# Patient Record
Sex: FEMALE | Race: BLACK OR AFRICAN AMERICAN | Employment: PART TIME | URBAN - METROPOLITAN AREA
[De-identification: names, ages, dates, MRNs, and addresses within clinical notes are randomized per-mention and may not be internally consistent; named-entity substitution may affect disease eponyms.]

---

## 2021-03-18 ENCOUNTER — IMMUNIZATION (OUTPATIENT)
Dept: PRIMARY CARE CLINIC | Age: 56
End: 2021-03-18
Payer: MEDICARE

## 2021-03-18 PROCEDURE — 0011A PR IMM ADMN SARSCOV2 100 MCG/0.5 ML 1ST DOSE: CPT | Performed by: FAMILY MEDICINE

## 2021-03-18 PROCEDURE — 91301 COVID-19, MODERNA VACCINE 100MCG/0.5ML DOSE: CPT | Performed by: FAMILY MEDICINE

## 2021-04-15 ENCOUNTER — IMMUNIZATION (OUTPATIENT)
Dept: PRIMARY CARE CLINIC | Age: 56
End: 2021-04-15
Payer: MEDICARE

## 2021-04-15 PROCEDURE — 91301 COVID-19, MODERNA VACCINE 100MCG/0.5ML DOSE: CPT | Performed by: FAMILY MEDICINE

## 2021-04-15 PROCEDURE — 0012A COVID-19, MODERNA VACCINE 100MCG/0.5ML DOSE: CPT | Performed by: FAMILY MEDICINE

## 2021-06-09 ENCOUNTER — HOSPITAL ENCOUNTER (OUTPATIENT)
Dept: PHYSICAL THERAPY | Age: 56
Setting detail: THERAPIES SERIES
Discharge: HOME | End: 2021-06-09
Payer: MEDICARE

## 2021-06-09 PROCEDURE — G0283 ELEC STIM OTHER THAN WOUND: HCPCS

## 2021-06-09 PROCEDURE — 97140 MANUAL THERAPY 1/> REGIONS: CPT

## 2021-06-09 PROCEDURE — 97110 THERAPEUTIC EXERCISES: CPT

## 2021-06-09 PROCEDURE — 97162 PT EVAL MOD COMPLEX 30 MIN: CPT

## 2021-06-09 ASSESSMENT — PAIN SCALES - QUEBEC BACK PAIN DISABILITY SCALE
LIFT AND CARRY A HEAVY SUITCASE: 5
THROW A BALL: 1
PULL OR PUSH HEAVY DOORS: 5
PUT ON SOCKS OR PANYHOSE: 0
REACH UP TO HIGH SHELVES: 5
RIDE IN A CAR: 2
WALK A FEW BLOCKS OR 300 TO 400M: 4
MAKE YOUR BED: 3
CARRY TWO BAGS OF GROCERIES: 1
RUN ONE BLOCK OR 100M: 5
TAKE FOOD OUT OF THE REFRIGERATOR: 0
SIT IN A CHAIR FOR SEVERAL HOURS: 4
QUEBEC DISABILITY INDEX: 60-79%
BEND OVER TO CLEAN THE BATHTUB: 4
MOVE A CHAIR: 1
TOTAL SCORE: 61
TURN OVER IN BED: 4
QUEBEC CMS MODIFIER: CL
STAND UP FOR 20 TO 30 MINUTES: 4
GET OUT OF BED: 2
SLEEP THROUGH THE NIGHT: 4
WALK SEVERAL KILOMETERS  OR MILES: 5
CLIMB ONE FLIGHT OF STAIRS: 2

## 2021-06-09 NOTE — FLOWSHEET NOTE
[facility] [place]. [address]  Phone: [phone]   Fax:     [phone]    Physical Therapy Treatment Note/ Progress Report:     Date:  2021    Patient Name:  Aj Valadez    :  1965  MRN: [id number]    Pertinent Medical History:      Medical/Treatment Diagnosis Information:  · Diagnosis: M54.5 (ICD-10-CM) - Low back pain  · Treatment Diagnosis: Back pain on left lumbar areas and hip. diffficutly with ADL's. limited mobility, weakness, difficulty with walking. Insurance/Certification information:  PT Insurance Information: [employer]  Physician Information:  Referring Practitioner: Theresa Mcpherson MD  Plan of care signed (Y/N): Sent to Dr. Luisa Vasquez on 21  Date of Patient follow up with Physician:      Progress Report: []  Yes  [x]  No     Date Range for reporting period:  Beginnin2021  Ending:    Progress report due (10 Rx/or 30 days whichever is less):     Recertification due (POC duration/ or 90 days whichever is less):    Visit # POC/ Insurance Allowable Auth Needed   1  []Yes    []No     Functional Scale:       Date Assessed: at eval  Test: [place]  Score: 61    Pain level:  5/10     History of Injury:Pt reports that pain began on May 9, 2021 when she woke up that morning. She has been walking dogs since last July and feels that  her pain may be related to the dogs pulling on the leash. She tends to hold the dog leash on the right side. Her pain is on the left low back. She also has OA on lumbar spine. Her pain is getting better.     SUBJECTIVE:  See eval    OBJECTIVE: See eval   Observation:    Test measurements:      RESTRICTIONS/PRECAUTIONS: OA and RA    Exercises/Interventions:     Therapeutic Ex (74464)   Min: Resistance/Reps Notes/Cues   Seated lateral flexion to right  add   LTR  add                                      Manual Intervention ([id number]) Disability index score of 10 % or less for the TUTU to assist with reaching prior level of function. []? Progressing: []? Met: []? Not Met: []? Adjusted  2. Patient will demonstrate increased AROM to WNL, good LS mobility, good hip ROM to allow for proper joint functioning as indicated by patients Functional Deficits. []? Progressing: []? Met: []? Not Met: []? Adjusted  3. Patient will demonstrate an increase in Strength to good proximal hip and core activation to allow for proper functional mobility as indicated by patients Functional Deficits. []? Progressing: []? Met: []? Not Met: []? Adjusted  4. Patient will return to 90% functional activities without increased symptoms or restriction. []? Progressing: []? Met: []? Not Met: []? Adjusted  5. Pt will be able to resume household chores without increase of symptoms (patient specific functional goal)    []? Progressing: []? Met: []? Not Met: []? Adjusted          ASSESSMENT:  Pt reports pain that started when she was walking dogs and they pulled her to the right, straining the left lumbar spine. Her referring MD indicated that he believes her pain is more muscular and her imaging shows some DDD and nerve compression as well. Treatment/Activity Tolerance:  [x] Patient tolerated treatment well [] Patient limited by fatique  [] Patient limited by pain  [] Patient limited by other medical complications  [] Other:     Overall Progression Towards Functional goals/ Treatment Progress Update:  [] Patient is progressing as expected towards functional goals listed. [] Progression is slowed due to complexities/Impairments listed. [] Progression has been slowed due to co-morbidities.   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 Continue with manual therapy and modalities as needed. Add exercise. [] Continue per plan of care [] Alter current plan (see comments)  [x] Plan of care initiated [] Hold pending MD visit [] Discharge    Electronically signed by: Jarrett Oliva, PT    Note: If patient does not return for scheduled/recommended follow up visits, this note will serve as a discharge from care along with the most recent update on progress.

## 2021-06-09 NOTE — PLAN OF CARE
[address] [facility] [place]. [address] [address]  Phone: [phone]   Fax:     [phone]                                                       Physical Therapy Certification    Dear Referring Practitioner: Rajiv Bowden MD,    We had the pleasure of evaluating the following patient for physical therapy services at [facility]. A summary of our findings can be found in the initial assessment below. This includes our plan of care. If you have any questions or concerns regarding these findings, please do not hesitate to contact me at the office phone number checked above. Thank you for the referral.       Physician Signature:_______________________________Date:__________________  By signing above (or electronic signature), therapists plan is approved by physician                  Patient: Gaurav Hyman   : 1965   MRN: [id number]  Referring Physician: Referring Practitioner: Rajiv Bowden MD      Evaluation Date: 2021      Medical Diagnosis Information:  Diagnosis: M54.5 (ICD-10-CM) - Low back pain   Treatment Diagnosis: Back pain on left lumbar areas and hip. diffficutly with ADL's. limited mobility, weakness, difficulty with walking.                                          Insurance information: PT Insurance Information: [employer]     Precautions/ Contra-indications:   Latex Allergy:  [x]NO      []YES  Preferred Language for Healthcare:   [x]English       []other:    C-SSRS Triggered by Intake questionnaire (Past 2 wk assessment ):   [x] No, Questionnaire did not trigger screening.   [] Yes, Patient intake triggered C-SSRS Screening      [] C-SSRS Screening completed  [] PCP notified via Epic     SUBJECTIVE: Patient stated complaint:  Back pain    Relevant Medical History:   Functional Outcome Measure: [place] =  61    Pain Scale:5/10  Easing factors: Rest  Provocative factors: walking,standing, bending    Type: [x]Constant   []Intermittent  []Radiating []Localized []other:     Numbness/Tingling: Lateral and medial aspect of both legs, equal on chava sides    Occupation/School:  On disability    Living Status/Prior Level of Function: Independent with ADLs and IADLs,     OBJECTIVE:   Posture: pes planus bilaterally    Functional Mobility/Transfers: Slow and painful    Palpation: Tightness noted on left paraspinals    Gait: (include devices/WB status) slow federico, left hip internal rotation, normal and equal stride length, minimal trunk rotation     Bandages/Dressings/Incisions: NA    Repeated Movements:    ROM  Comments   Lumbar Flex 71    Lumbar Ext 18      ROM LEFT RIGHT Comments   Lumbar Side Bend 20 21    Lumbar Rotation 25% 35%    Quadrant   Not tested   Hip Flexion 85 90    Hip Abd      Hip ER      Hip IR      Hip Extension      Knee Ext 0 0    Knee Flex 110 130    Hamstring Flex      Piriformis      Cleve test                Myotomes/Strength Normal Abnormal Comments   []ALL NORMAL      Hip Abd      Hip Ext      Hip flexion (L1-L2 femoral) [] [x] Weakness noted bilaterally   Knee extension (L2-L4 femoral) [] [x] Weakness noted   Knee flexion (S1 sciatic)   Weakness noted    Dorsiflexion (L4-L5 deep peroneal) [] [x] Weakness noted   Great Toe Ext (L5 deep peroneal nerve) [] []    Ankle Eversion (S1-S2 super peroneal) [] []    Ankle PF(S1-S2 tibial) [] []    Multifidus [] []    Transverse Ab [] []      Dermatomes Normal Abnormal Comments   []ALL NORMAL            inguinal area (L1)  [] []    anterior mid-thigh (L2) [x] []    distal ant thigh/med knee (L3) [x] []    medial lower leg and foot (L4) [] [x]    lateral lower leg and foot (L5) [] [x]    posterior calf (S1) [] []    medial calcaneus (S2) [] []      Reflexes Normal Abnormal Comments   [x]ALL NORMAL            S1-2 Seated achilles [x] []    S1-2 Prone knee bend [] []    L3-4 Patellar tendon [x] [] C5-6 Biceps [] []    C6 Brachioradialis [] []    C7-8 Triceps [] []    Clonus [] []    Babinski [] []    Villareal's [] []      Joint mobility:    []Normal    [x]Hypo on lumbar spine   []Hyper    Neurodynamics:     Orthopedic Special Tests:   Neural dynamic tension testing Normal Abnormal Comments   Slump Test  - Degree of knee flexion:  [] []    SLR  [] []    0-30 [] []    30-70 [] []    Femoral nerve (L2-4) [] []       Normal Abnormal N/A Comments   Fwd Bend-aberrant or innominate mvmt) [] [] []    Trendelenburg [] [] []    Kemps/Ale [] [] []    Stork [] [] []    JUSTIN/Juve [] [] []    Hip scour [] [] []    Supine to sit [] [] []    Prone knee bend [] [] [] Pt is unable to lie prone due to iliostomy          Hip thrust [] [] []    SI distraction/compression [] [] []    Sacral Spring/thrust [] [] []               [x] Patient history, allergies, meds reviewed. Medical chart reviewed. See intake form. Review Of Systems (ROS):  [x]Performed Review of systems (Integumentary, CardioPulmonary, Neurological) by intake and observation. Intake form has been scanned into medical record. Patient has been instructed to contact their primary care physician regarding ROS issues if not already being addressed at this time.       Co-morbidities/Complexities (which will affect course of rehabilitation):   []None           Arthritic conditions   [x]Rheumatoid arthritis (M05.9)  [x]Osteoarthritis (M19.91)   Cardiovascular conditions   []Hypertension (I10)  []Hyperlipidemia (E78.5)  []Angina pectoris (I20)  []Atherosclerosis (I70)  []CVA Musculoskeletal conditions   []Disc pathology   []Congenital spine pathologies   []Prior surgical intervention  []Osteoporosis (M81.8)  []Osteopenia (M85.8)   Endocrine conditions   []Hypothyroid (E03.9)  []Hyperthyroid Gastrointestinal conditions   []Constipation (Y32.28)   Metabolic conditions   []Morbid obesity (E66.01)  []Diabetes type 1(E10.65) or 2 (E11.65)   []Neuropathy (G60.9) ability to ascend/descend stairs   []other:       Participation Restrictions   []Reduced participation in self care activities   [x]Reduced participation in home management activities   []Reduced participation in work activities   [x]Reduced participation in social activities. []Reduced participation in sport/recreational activities. Classification:   []Signs/symptoms consistent with Lumbar instability/stabilization subgroup. [x]Signs/symptoms consistent with Lumbar mobilization/manipulation subgroup, myotomes and dermatomes intact. Meets manipulation criteria. []Signs/symptoms consistent with Lumbar direction specific/centralization subgroup   [x]Signs/symptoms consistent with Lumbar traction subgroup       []Signs/symptoms consistent with lumbar facet dysfunction   []Signs/symptoms consistent with lumbar stenosis type dysfunction   []Signs/symptoms consistent with nerve root involvement including myotome & dermatome dysfunction   []Signs/symptoms consistent with post-surgical status including: decreased ROM, strength and function.    []signs/symptoms consistent with pathology which may benefit from Dry needling     []other:      Prognosis/Rehab Potential:      []Excellent   [x]Good    []Fair   []Poor    Tolerance of evaluation/treatment:    []Excellent   [x]Good    []Fair   []Poor     Physical Therapy Evaluation Complexity Justification  [x] A history of present problem with:  [] no personal factors and/or comorbidities that impact the plan of care;  []1-2 personal factors and/or comorbidities that impact the plan of care  [x]3 personal factors and/or comorbidities that impact the plan of care  [x] An examination of body systems using standardized tests and measures addressing any of the following: body structures and functions (impairments), activity limitations, and/or participation restrictions;:  [] a total of 1-2 or more elements   [x] a total of 3 or more elements   [] a total of 4 or more elements   [x] A clinical presentation with:  [] stable and/or uncomplicated characteristics   [x] evolving clinical presentation with changing characteristics  [] unstable and unpredictable characteristics;   [x] Clinical decision making of [] low, [x] moderate, [] high complexity using standardized patient assessment instrument and/or measurable assessment of functional outcome. [] EVAL (LOW) 30658 (typically 20 minutes face-to-face)  [x] EVAL (MOD) 76663 (typically 30 minutes face-to-face)  [] EVAL (HIGH) 76400 (typically 45 minutes face-to-face)  [] RE-EVAL     PLAN: Begin PT focusing on: proximal hip mobilizations, LB mobs, LB core activation, proximal hip activation, and HEP    Frequency/Duration:  2 days per week for 4-6 Weeks:  Interventions:  [x]  Therapeutic exercise including: strength training, ROM, for LE, Glutes and core   [x]  NMR activation and proprioception for glutes , LE and Core   [x]  Manual therapy as indicated for Hip complex, LE and spine to include: Dry Needling/IASTM, STM, PROM, Gr I-IV mobilizations, manipulation. [x]  Modalities as needed that may include: thermal agents, E-stim, Biofeedback, US, iontophoresis as indicated  [x]  Patient education on joint protection, postural re-education, activity modification, progression of HEP. HEP instruction:  Will teach next time    GOALS:  Patient stated goal: \"To get to at least 1%  or less  pain\"  [] Progressing: [] Met: [] Not Met: [] Adjusted  Therapist goals for Patient:   Short Term Goals: To be achieved in: 2 weeks  1. Independent in HEP and progression per patient tolerance, in order to prevent re-injury. [] Progressing: [] Met: [] Not Met: [] Adjusted  2. Patient will have a decrease in pain to facilitate improvement in movement, function, and ADLs as indicated by Functional Deficits. [] Progressing: [] Met: [] Not Met: [] Adjusted    Long Term Goals: To be achieved in: 4-6 weeks  1.  Disability index score of 10 % or less for the TUTU to assist with reaching prior level of function. [] Progressing: [] Met: [] Not Met: [] Adjusted  2. Patient will demonstrate increased AROM to WNL, good LS mobility, good hip ROM to allow for proper joint functioning as indicated by patients Functional Deficits. [] Progressing: [] Met: [] Not Met: [] Adjusted  3. Patient will demonstrate an increase in Strength to good proximal hip and core activation to allow for proper functional mobility as indicated by patients Functional Deficits. [] Progressing: [] Met: [] Not Met: [] Adjusted  4. Patient will return to 90% functional activities without increased symptoms or restriction. [] Progressing: [] Met: [] Not Met: [] Adjusted  5. Pt will be able to resume household chores without increase of symptoms (patient specific functional goal)    [] Progressing: [] Met: [] Not Met: [] Adjusted     Electronically signed by:  Soraya Monroy PT        Note: If patient does not return for scheduled/recommended follow up visits, this note will serve as a discharge from care along with the most recent update on progress.

## 2021-06-16 ENCOUNTER — HOSPITAL ENCOUNTER (OUTPATIENT)
Dept: PHYSICAL THERAPY | Age: 56
Setting detail: THERAPIES SERIES
Discharge: HOME | End: 2021-06-16
Payer: MEDICARE

## 2021-06-16 PROCEDURE — 97140 MANUAL THERAPY 1/> REGIONS: CPT

## 2021-06-16 PROCEDURE — 97530 THERAPEUTIC ACTIVITIES: CPT

## 2021-06-16 PROCEDURE — G0283 ELEC STIM OTHER THAN WOUND: HCPCS

## 2021-06-16 PROCEDURE — 97110 THERAPEUTIC EXERCISES: CPT

## 2021-06-16 NOTE — FLOWSHEET NOTE
right 30 sec x 3 Added 6/16/21   LTR 2 min Added 6/16/21   PPT 5 sec x 10 Added 6/16   Bridging 5 sec x 10 Added 6/16   Clamshells 20X Added 6/16                       Manual Intervention (32610) Min:      Right sidelying STM to left paraspinals, Intervertebral gapping through rotation, MET while left sidelying         NMR re-education (63132)   Min:     Mf Activation- re-ed     TrA Re-ed activation     Glute Max re-ed activation          Therapeutic Activity (18689) Min:      Added body mechanics and log rolling, gave brochure to reinforce principles with demonstration by PT         Modalities  Min:       IFC with heat while right sidelying x 15 min (3 electrodes on left, one on right      Other Therapeutic Activities:  Pt was educated on PT POC, Diagnosis, Prognosis, pathomechanics as well as frequency and duration of scheduling future physical therapy appointments. Time was also taken on this day to answer all patient questions and participation in PT. Reviewed appointment policy in detail with patient and patient verbalized understanding. Home Exercise Program:   Access Code: [email or web address]  URL: [employer]/  Date: 06/16/2021  Prepared by:  Richardson Lewis    Exercises  Supine Bridge - 1 x daily - 7 x weekly - 10 reps - 3 sets  Clamshell - 1 x daily - 7 x weekly - 10 reps - 3 sets  Supine Lower Trunk Rotation - 1 x daily - 7 x weekly - 10 reps - 3 sets  Supine Posterior Pelvic Tilt - 1 x daily - 7 x weekly - 10 reps - 3 sets  Seated Sidebending - 1 x daily - 7 x weekly - 10 reps - 3 sets    Therapeutic Exercise and NMR EXR  [x] (25822) Provided verbal/tactile cueing for activities related to strengthening, flexibility, endurance, ROM  for improvements in proximal hip and core control with self care, mobility, lifting and ambulation.  [] (86368) Provided verbal/tactile cueing for activities related to improving balance, coordination, kinesthetic sense, posture, motor skill, proprioception  to 1  [] [facility] Traction (81881)  [] ES(attended) (12185)     [x] ES (un) (70959): 15 min  [] Vasopump (64540) [] Ionto (22691)   [] Other:    GOALS:  Patient stated goal: \"To get to at least 1%  or less  pain\"  []? Progressing: []? Met: []? Not Met: []? Adjusted  Therapist goals for Patient:   Short Term Goals: To be achieved in: 2 weeks  1. Independent in HEP and progression per patient tolerance, in order to prevent re-injury. []? Progressing: []? Met: []? Not Met: []? Adjusted  2. Patient will have a decrease in pain to facilitate improvement in movement, function, and ADLs as indicated by Functional Deficits. []? Progressing: []? Met: []? Not Met: []? Adjusted     Long Term Goals: To be achieved in: 4-6 weeks  1. Disability index score of 10 % or less for the TUTU to assist with reaching prior level of function. []? Progressing: []? Met: []? Not Met: []? Adjusted  2. Patient will demonstrate increased AROM to WNL, good LS mobility, good hip ROM to allow for proper joint functioning as indicated by patients Functional Deficits. []? Progressing: []? Met: []? Not Met: []? Adjusted  3. Patient will demonstrate an increase in Strength to good proximal hip and core activation to allow for proper functional mobility as indicated by patients Functional Deficits. []? Progressing: []? Met: []? Not Met: []? Adjusted  4. Patient will return to 90% functional activities without increased symptoms or restriction. []? Progressing: []? Met: []? Not Met: []? Adjusted  5. Pt will be able to resume household chores without increase of symptoms (patient specific functional goal)    []? Progressing: []? Met: []? Not Met: []? Adjusted          ASSESSMENT:  Pt reports pain that started when she was walking dogs and they pulled her to the right, straining the left lumbar spine. Her referring MD indicated that he believes her pain is more muscular and her imaging shows some DDD and nerve compression as well.  Pt tolerated treatment well and seemed to understand principles of proper body mechanics. Treatment/Activity Tolerance:  [x] Patient tolerated treatment well [] Patient limited by fatique  [] Patient limited by pain  [] Patient limited by other medical complications  [] Other:     Overall Progression Towards Functional goals/ Treatment Progress Update:  [] Patient is progressing as expected towards functional goals listed. [] Progression is slowed due to complexities/Impairments listed. [] Progression has been slowed due to co-morbidities. 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 Continue with manual therapy and modalities as needed. Add exercise. [x] Continue per plan of care [] Alter current plan (see comments)  [] Plan of care initiated [] Hold pending MD visit [] Discharge    Electronically signed by: Alyssa Griffith, PT    Note: If patient does not return for scheduled/recommended follow up visits, this note will serve as a discharge from care along with the most recent update on progress.

## 2021-06-17 ENCOUNTER — HOSPITAL ENCOUNTER (OUTPATIENT)
Dept: PHYSICAL THERAPY | Age: 56
Setting detail: THERAPIES SERIES
Discharge: HOME | End: 2021-06-17
Payer: MEDICARE

## 2021-06-17 PROCEDURE — 97110 THERAPEUTIC EXERCISES: CPT

## 2021-06-17 PROCEDURE — 97140 MANUAL THERAPY 1/> REGIONS: CPT

## 2021-06-17 PROCEDURE — G0283 ELEC STIM OTHER THAN WOUND: HCPCS

## 2021-06-17 NOTE — FLOWSHEET NOTE
[facility]. [address]  Phone: [phone]   Fax:     [phone]    Physical Therapy Treatment Note/ Progress Report:     Date:  2021    Patient Name:  Annabelle Schafer    :  1965  MRN: [id number]    Pertinent Medical History:      Medical/Treatment Diagnosis Information:  · Diagnosis: M54.5 (ICD-10-CM) - Low back pain  · Treatment Diagnosis: Back pain on left lumbar areas and hip. diffficutly with ADL's. limited mobility, weakness, difficulty with walking. Insurance/Certification information:  PT Insurance Information: [employer]  Physician Information:  Referring Practitioner: Yoshi Carvajal MD  Plan of care signed (Y/N): Sent to Dr. Elenora Harada on 21  Date of Patient follow up with Physician:      Progress Report: []  Yes  [x]  No     Date Range for reporting period:  Beginnin2021  Ending:    Progress report due (10 Rx/or 30 days whichever is less):     Recertification due (POC duration/ or 90 days whichever is less):    Visit # POC/ Insurance Allowable Auth Needed   3  []Yes    []No     Functional Scale:       Date Assessed: at eval  Test: [place]  Score: 61    Pain level:  6.5/10     History of Injury:Pt reports that pain began on May 9, 2021 when she woke up that morning. She has been walking dogs since last July and feels that  her pain may be related to the dogs pulling on the leash. She tends to hold the dog leash on the right side. Her pain is on the left low back. She also has OA on lumbar spine. Her pain is getting better. SUBJECTIVE:  21: Pt reports that she still feels like 5/10, but did note relief after the last session. 21: Pt reports that she is having a rough day  since she had a procedure and abdominal biopsy yesterday.     OBJECTIVE: See eval   Observation:    Test measurements:      RESTRICTIONS/PRECAUTIONS: OA and RA    Exercises/Interventions:     Therapeutic Ex (71522)   Min: Resistance/Reps Notes/Cues    30 sec x 3 Added 6/16/21   LTR 2 min Added 6/16/21   PPT 5 sec x 10 Added 6/16   Bridging 5 sec x 10 Added 6/16   Clamshells 20X Added 6/16                       Manual Intervention (50352) Min:      Right sidelying STM to left paraspinals, Intervertebral gapping through rotation, MET while left sidelying         NMR re-education (03367)   Min:     Mf Activation- re-ed     TrA Re-ed activation     Glute Max re-ed activation          Therapeutic Activity (44500) Min:               Modalities  Min:       IFC with heat while right sidelying x 15 min (3 electrodes on left, one on right      Other Therapeutic Activities:  Pt was educated on PT POC, Diagnosis, Prognosis, pathomechanics as well as frequency and duration of scheduling future physical therapy appointments. Time was also taken on this day to answer all patient questions and participation in PT. Reviewed appointment policy in detail with patient and patient verbalized understanding. Home Exercise Program:   Access Code: [email or web address]  URL: [employer]/  Date: 06/16/2021  Prepared by:  Elizabeth Salvage    Exercises  Supine Bridge - 1 x daily - 7 x weekly - 10 reps - 3 sets  Clamshell - 1 x daily - 7 x weekly - 10 reps - 3 sets  Supine Lower Trunk Rotation - 1 x daily - 7 x weekly - 10 reps - 3 sets  Supine Posterior Pelvic Tilt - 1 x daily - 7 x weekly - 10 reps - 3 sets  Seated Sidebending - 1 x daily - 7 x weekly - 10 reps - 3 sets    Therapeutic Exercise and NMR EXR  [x] (10049) Provided verbal/tactile cueing for activities related to strengthening, flexibility, endurance, ROM  for improvements in proximal hip and core control with self care, mobility, lifting and ambulation.  [] (67004) Provided verbal/tactile cueing for activities related to improving balance, coordination, kinesthetic sense, posture, motor skill, proprioception  to assist with core control in self care, mobility, lifting, and ambulation. Therapeutic Activities:    [x] (27124 or 50916) Provided verbal/tactile cueing for activities related to improving balance, coordination, kinesthetic sense, posture, motor skill, proprioception and motor activation to allow for proper function  with self care and ADLs  [] (27644) Provided training and instruction to the patient for proper core and proximal hip recruitment and positioning with ambulation re-education     Home Exercise Program:    [x] (75375) Reviewed/Progressed HEP activities related to strengthening, flexibility, endurance, ROM of core, proximal hip and LE for functional self-care, mobility, lifting and ambulation   [] (52552) Reviewed/Progressed HEP activities related to improving balance, coordination, kinesthetic sense, posture, motor skill, proprioception of core, proximal hip and LE for self care, mobility, lifting, and ambulation      Manual Treatments:  PROM / STM / Oscillations-Mobs:  G-I, II, III, IV (PA's, Inf., Post.)  [x] (33228) Provided manual therapy to mobilize proximal hip and LS spine soft tissue/joints for the purpose of modulating pain, promoting relaxation,  increasing ROM, reducing/eliminating soft tissue swelling/inflammation/restriction, improving soft tissue extensibility and allowing for proper ROM for normal function with self care, mobility, lifting and ambulation.      Approval Dates:  CPT Code Units Approved Units Used  Date Updated:                     Charges:  Timed Code Treatment Minutes: 35    Total Treatment Minutes: 50      [] EVAL (LOW) 73073 (typically 20 minutes face-to-face)  [] EVAL (MOD) 20022 (typically 30 minutes face-to-face)  [] EVAL (HIGH) 10035 (typically 45 minutes face-to-face)  [] RE-EVAL     [x] LO(58025) x 1    [] Dry needle 1 or 2 Muscles (04851)  [] NMR (56314) x     [] Dry needle 3+ Muscles (15021)  [x] Manual (63803) x  1   [] Ultrasound (15601) x  [] TA (02820) x   1  [] Lauren Mckeon Traction (62569)  [] ES(attended) (97787)     [x] ES (un) (77520): 15 min  [] Vasopump (14542) [] Ionto (66733)   [] Other:    GOALS:  Patient stated goal: \"To get to at least 1/10  or less  pain\"  []? Progressing: []? Met: []? Not Met: []? Adjusted  Therapist goals for Patient:   Short Term Goals: To be achieved in: 2 weeks  1. Independent in HEP and progression per patient tolerance, in order to prevent re-injury. []? Progressing: []? Met: []? Not Met: []? Adjusted  2. Patient will have a decrease in pain to facilitate improvement in movement, function, and ADLs as indicated by Functional Deficits. []? Progressing: []? Met: []? Not Met: []? Adjusted     Long Term Goals: To be achieved in: 4-6 weeks  1. Disability index score of 10 % or less for the TUTU to assist with reaching prior level of function. []? Progressing: []? Met: []? Not Met: []? Adjusted  2. Patient will demonstrate increased AROM to WNL, good LS mobility, good hip ROM to allow for proper joint functioning as indicated by patients Functional Deficits. []? Progressing: []? Met: []? Not Met: []? Adjusted  3. Patient will demonstrate an increase in Strength to good proximal hip and core activation to allow for proper functional mobility as indicated by patients Functional Deficits. []? Progressing: []? Met: []? Not Met: []? Adjusted  4. Patient will return to 90% functional activities without increased symptoms or restriction. []? Progressing: []? Met: []? Not Met: []? Adjusted  5. Pt will be able to resume household chores without increase of symptoms (patient specific functional goal)    []? Progressing: []? Met: []? Not Met: []? Adjusted          ASSESSMENT:  Pt reports pain that started when she was walking dogs and they pulled her to the right, straining the left lumbar spine. Her referring MD indicated that he believes her pain is more muscular and her imaging shows some DDD and nerve compression as well.  Pt tolerated treatment well and

## 2021-06-22 ENCOUNTER — HOSPITAL ENCOUNTER (OUTPATIENT)
Dept: PHYSICAL THERAPY | Age: 56
Setting detail: THERAPIES SERIES
Discharge: HOME | End: 2021-06-22
Payer: MEDICARE

## 2021-06-22 PROCEDURE — G0283 ELEC STIM OTHER THAN WOUND: HCPCS

## 2021-06-22 PROCEDURE — 97110 THERAPEUTIC EXERCISES: CPT

## 2021-06-22 PROCEDURE — 97140 MANUAL THERAPY 1/> REGIONS: CPT

## 2021-06-22 NOTE — FLOWSHEET NOTE
[facility]. [address] [place] [address]  Phone: [phone]   Fax:     [phone]    Physical Therapy Treatment Note/ Progress Report:     Date:  2021    Patient Name:  Xander Dougherty    :  1965  MRN: [id number]    Pertinent Medical History:      Medical/Treatment Diagnosis Information:  · Diagnosis: M54.5 (ICD-10-CM) - Low back pain  · Treatment Diagnosis: Back pain on left lumbar areas and hip. diffficutly with ADL's. limited mobility, weakness, difficulty with walking. Insurance/Certification information:  PT Insurance Information: [employer]  Physician Information:  Referring Practitioner: Yoav Alston MD  Plan of care signed (Y/N): Sent to Dr. Fortino Suarez on 21  Date of Patient follow up with Physician:      Progress Report: []  Yes  [x]  No     Date Range for reporting period:  Beginnin2021  Ending:    Progress report due (10 Rx/or 30 days whichever is less):     Recertification due (POC duration/ or 90 days whichever is less):    Visit # POC/ Insurance Allowable Auth Needed   4  []Yes    []No     Functional Scale:       Date Assessed: at eval  Test: [place]  Score: 61    Pain level:  4/10     History of Injury:Pt reports that pain began on May 9, 2021 when she woke up that morning. She has been walking dogs since last July and feels that  her pain may be related to the dogs pulling on the leash. She tends to hold the dog leash on the right side. Her pain is on the left low back. She also has OA on lumbar spine. Her pain is getting better. SUBJECTIVE:  21: Pt reports that she still feels like 5/10, but did note relief after the last session. 21: Pt reports that she is having a rough day  since she had a procedure and abdominal biopsy yesterday. 21: Patient reports back is feeling a little better,stiffness and soreness on left side.      OBJECTIVE: See eval   Observation:    Test measurements:      RESTRICTIONS/PRECAUTIONS: OA and RA    Exercises/Interventions:     Therapeutic Ex (94081)   Min: Resistance/Reps Notes/Cues    30 sec x 3 Added 6/16/21   LTR 2 min Added 6/16/21   PPT 5 sec x 10 Added 6/16   Bridging 5 sec x 10 Added 6/16   Clamshells 20X Added 6/16                       Manual Intervention (98665) Min:      Right sidelying STM to left paraspinals, Intervertebral gapping through rotation, MET while left sidelying         NMR re-education (61851)   Min:     Mf Activation- re-ed     TrA Re-ed activation     Glute Max re-ed activation          Therapeutic Activity (07010) Min:               Modalities  Min:       IFC with heat while right sidelying x 15 min (3 electrodes on left, one on right      Other Therapeutic Activities:  Pt was educated on PT POC, Diagnosis, Prognosis, pathomechanics as well as frequency and duration of scheduling future physical therapy appointments. Time was also taken on this day to answer all patient questions and participation in PT. Reviewed appointment policy in detail with patient and patient verbalized understanding. Home Exercise Program:   Access Code: [email or web address]  URL: [email or web address]/  Date: 06/16/2021  Prepared by:  Isabelle Nance    Exercises  Supine Bridge - 1 x daily - 7 x weekly - 10 reps - 3 sets  Clamshell - 1 x daily - 7 x weekly - 10 reps - 3 sets  Supine Lower Trunk Rotation - 1 x daily - 7 x weekly - 10 reps - 3 sets  Supine Posterior Pelvic Tilt - 1 x daily - 7 x weekly - 10 reps - 3 sets  Seated Sidebending - 1 x daily - 7 x weekly - 10 reps - 3 sets    Therapeutic Exercise and NMR EXR  [x] (60777) Provided verbal/tactile cueing for activities related to strengthening, flexibility, endurance, ROM  for improvements in proximal hip and core control with self care, mobility, lifting and ambulation.  [] (46501) Provided verbal/tactile cueing for activities related to improving balance, coordination, kinesthetic sense, posture, motor skill, proprioception  to assist with core control in self care, mobility, lifting, and ambulation. Therapeutic Activities:    [x] (97153 or 80798) Provided verbal/tactile cueing for activities related to improving balance, coordination, kinesthetic sense, posture, motor skill, proprioception and motor activation to allow for proper function  with self care and ADLs  [] (19057) Provided training and instruction to the patient for proper core and proximal hip recruitment and positioning with ambulation re-education     Home Exercise Program:    [x] (41645) Reviewed/Progressed HEP activities related to strengthening, flexibility, endurance, ROM of core, proximal hip and LE for functional self-care, mobility, lifting and ambulation   [] (76552) Reviewed/Progressed HEP activities related to improving balance, coordination, kinesthetic sense, posture, motor skill, proprioception of core, proximal hip and LE for self care, mobility, lifting, and ambulation      Manual Treatments:  PROM / STM / Oscillations-Mobs:  G-I, II, III, IV (PA's, Inf., Post.)  [x] (87935) Provided manual therapy to mobilize proximal hip and LS spine soft tissue/joints for the purpose of modulating pain, promoting relaxation,  increasing ROM, reducing/eliminating soft tissue swelling/inflammation/restriction, improving soft tissue extensibility and allowing for proper ROM for normal function with self care, mobility, lifting and ambulation.      Approval Dates:  CPT Code Units Approved Units Used  Date Updated:                     Charges:  Timed Code Treatment Minutes: 35    Total Treatment Minutes: 50      [] EVAL (LOW) 16480 (typically 20 minutes face-to-face)  [] EVAL (MOD) 27428 (typically 30 minutes face-to-face)  [] EVAL (HIGH) 11673 (typically 45 minutes face-to-face)  [] RE-EVAL     [x] LB(97602) x 1    [] Dry needle 1 or 2 Muscles (42509)  [] NMR (61677) x     [] Dry needle 3+ Muscles her imaging shows some DDD and nerve compression as well. Pt tolerated treatment well and seemed to understand principles of proper body mechanics. Treatment/Activity Tolerance:  [x] Patient tolerated treatment well [] Patient limited by fatique  [] Patient limited by pain  [] Patient limited by other medical complications  [] Other:     Overall Progression Towards Functional goals/ Treatment Progress Update:  [] Patient is progressing as expected towards functional goals listed. [] Progression is slowed due to complexities/Impairments listed. [] Progression has been slowed due to co-morbidities. 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 Continue with manual therapy and modalities as needed. Add exercise. [x] Continue per plan of care [] Alter current plan (see comments)  [] Plan of care initiated [] Hold pending MD visit [] Discharge    Electronically signed by: Makenna Morejon PTA    Note: If patient does not return for scheduled/recommended follow up visits, this note will serve as a discharge from care along with the most recent update on progress.

## 2021-06-24 ENCOUNTER — HOSPITAL ENCOUNTER (OUTPATIENT)
Dept: PHYSICAL THERAPY | Age: 56
Setting detail: THERAPIES SERIES
Discharge: HOME | End: 2021-06-24
Payer: MEDICARE

## 2021-06-24 PROCEDURE — 97110 THERAPEUTIC EXERCISES: CPT

## 2021-06-24 PROCEDURE — 97140 MANUAL THERAPY 1/> REGIONS: CPT

## 2021-06-24 PROCEDURE — 97112 NEUROMUSCULAR REEDUCATION: CPT

## 2021-06-24 PROCEDURE — G0283 ELEC STIM OTHER THAN WOUND: HCPCS

## 2021-06-24 NOTE — FLOWSHEET NOTE
reports pain is on left low back, no leg pain. Pain is very minimal today (1/10). She had a busy day with her grandson yesterday. OBJECTIVE: See eval   Observation:    Test measurements:      RESTRICTIONS/PRECAUTIONS: OA and RA    Exercises/Interventions:     Therapeutic Ex (60640)   Min: Resistance/Reps Notes/Cues    30 sec x 3 Added 6/16/21   LTR 2 min Added 6/16/21   PPT 5 sec x 10 Added 6/16   Bridging 5 sec x 10 Added 6/16   Clamshells 20X Added 6/16   Nu step 5 min                   Manual Intervention (30320) Min:      Right sidelying STM to left paraspinals,   IASTM to left lumbar area and hip         NMR re-education (56043)   Min:     Mf Activation- re-ed     TrA Re-ed activation          Glute Max re-ed activation       Omit due to increased back pain    SLS 5 sec x 10 on airex     DD with 10 minisquats     Wobble board with activation of core 2 min    Therapeutic Activity (60478) Min:               Modalities  Min:       IFC with heat while right sidelying x 15 min (3 electrodes on left, one on right      Other Therapeutic Activities:  Pt was educated on PT POC, Diagnosis, Prognosis, pathomechanics as well as frequency and duration of scheduling future physical therapy appointments. Time was also taken on this day to answer all patient questions and participation in PT. Reviewed appointment policy in detail with patient and patient verbalized understanding. Home Exercise Program:   Access Code: [email or web address]  URL: [employer]/  Date: 06/16/2021  Prepared by:  Sanda Lesches    Exercises  Supine Bridge - 1 x daily - 7 x weekly - 10 reps - 3 sets  Clamshell - 1 x daily - 7 x weekly - 10 reps - 3 sets  Supine Lower Trunk Rotation - 1 x daily - 7 x weekly - 10 reps - 3 sets  Supine Posterior Pelvic Tilt - 1 x daily - 7 x weekly - 10 reps - 3 sets  Seated Sidebending - 1 x daily - 7 x weekly - 10 reps - 3 sets    Therapeutic Exercise and NMR EXR  [x] (19279) Provided verbal/tactile cueing for activities related to strengthening, flexibility, endurance, ROM  for improvements in proximal hip and core control with self care, mobility, lifting and ambulation.  [] (55512) Provided verbal/tactile cueing for activities related to improving balance, coordination, kinesthetic sense, posture, motor skill, proprioception  to assist with core control in self care, mobility, lifting, and ambulation. Therapeutic Activities:    [x] (86842 or 49970) Provided verbal/tactile cueing for activities related to improving balance, coordination, kinesthetic sense, posture, motor skill, proprioception and motor activation to allow for proper function  with self care and ADLs  [] (46811) Provided training and instruction to the patient for proper core and proximal hip recruitment and positioning with ambulation re-education     Home Exercise Program:    [x] (74683) Reviewed/Progressed HEP activities related to strengthening, flexibility, endurance, ROM of core, proximal hip and LE for functional self-care, mobility, lifting and ambulation   [] (60873) Reviewed/Progressed HEP activities related to improving balance, coordination, kinesthetic sense, posture, motor skill, proprioception of core, proximal hip and LE for self care, mobility, lifting, and ambulation      Manual Treatments:  PROM / STM / Oscillations-Mobs:  G-I, II, III, IV (PA's, Inf., Post.)  [x] (95889) Provided manual therapy to mobilize proximal hip and LS spine soft tissue/joints for the purpose of modulating pain, promoting relaxation,  increasing ROM, reducing/eliminating soft tissue swelling/inflammation/restriction, improving soft tissue extensibility and allowing for proper ROM for normal function with self care, mobility, lifting and ambulation.      Approval Dates:  CPT Code Units Approved Units Used  Date Updated:                     Charges:  Timed Code Treatment Minutes: 45    Total Treatment Minutes: 60      [] EVAL (LOW) 61438 (typically 20 minutes face-to-face)  [] EVAL (MOD) 11033 (typically 30 minutes face-to-face)  [] EVAL (HIGH) 08289 (typically 45 minutes face-to-face)  [] RE-EVAL     [x] EI(49897) x 1    [] Dry needle 1 or 2 Muscles (67098)  [x] NMR (61943) x  1   [] Dry needle 3+ Muscles (27603)  [x] Manual (19973) x  1   [] Ultrasound (28197) x  [] TA (73174) x     [] Mech Traction (80204)  [] ES(attended) (14350)     [x] ES (un) (73460): 15 min  [] Vasopump (49252) [] Ionto (61925)   [] Other:    GOALS:  Patient stated goal: \"To get to at least 1/10  or less  pain\"  []? Progressing: []? Met: []? Not Met: []? Adjusted  Therapist goals for Patient:   Short Term Goals: To be achieved in: 2 weeks  1. Independent in HEP and progression per patient tolerance, in order to prevent re-injury. []? Progressing: []? Met: []? Not Met: []? Adjusted  2. Patient will have a decrease in pain to facilitate improvement in movement, function, and ADLs as indicated by Functional Deficits. []? Progressing: []? Met: []? Not Met: []? Adjusted     Long Term Goals: To be achieved in: 4-6 weeks  1. Disability index score of 10 % or less for the TUTU to assist with reaching prior level of function. []? Progressing: []? Met: []? Not Met: []? Adjusted  2. Patient will demonstrate increased AROM to WNL, good LS mobility, good hip ROM to allow for proper joint functioning as indicated by patients Functional Deficits. []? Progressing: []? Met: []? Not Met: []? Adjusted  3. Patient will demonstrate an increase in Strength to good proximal hip and core activation to allow for proper functional mobility as indicated by patients Functional Deficits. []? Progressing: []? Met: []? Not Met: []? Adjusted  4. Patient will return to 90% functional activities without increased symptoms or restriction. []? Progressing: []? Met: []? Not Met: []? Adjusted  5. Pt will be able to resume household chores without increase of symptoms (patient specific functional goal)    []? Progressing: []? Met: []? Not Met: []? Adjusted          ASSESSMENT:  Pt reports pain that started when she was walking dogs and they pulled her to the right, straining the left lumbar spine. Her referring MD indicated that he believes her pain is more muscular and her imaging shows some DDD and nerve compression as well. Pt tolerated treatment well and seemed to understand principles of proper body mechanics. Treatment/Activity Tolerance:  [x] Patient tolerated treatment well [] Patient limited by fatique  [] Patient limited by pain  [] Patient limited by other medical complications  [] Other:     Overall Progression Towards Functional goals/ Treatment Progress Update:  [] Patient is progressing as expected towards functional goals listed. [] Progression is slowed due to complexities/Impairments listed. [] Progression has been slowed due to co-morbidities. 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 Continue with manual therapy and modalities as needed. Add exercise. [x] Continue per plan of care [] Alter current plan (see comments)  [] Plan of care initiated [] Hold pending MD visit [] Discharge    Electronically signed by: Nancy Lagos, PT    Note: If patient does not return for scheduled/recommended follow up visits, this note will serve as a discharge from care along with the most recent update on progress.

## 2021-06-28 ENCOUNTER — HOSPITAL ENCOUNTER (OUTPATIENT)
Dept: PHYSICAL THERAPY | Age: 56
Setting detail: THERAPIES SERIES
Discharge: HOME | End: 2021-06-28
Payer: MEDICARE

## 2021-06-28 PROCEDURE — 97112 NEUROMUSCULAR REEDUCATION: CPT

## 2021-06-28 PROCEDURE — 97140 MANUAL THERAPY 1/> REGIONS: CPT

## 2021-06-28 PROCEDURE — G0283 ELEC STIM OTHER THAN WOUND: HCPCS

## 2021-06-28 PROCEDURE — 97110 THERAPEUTIC EXERCISES: CPT

## 2021-06-28 NOTE — FLOWSHEET NOTE
[address] [facility] [place]. [address] [address]  Phone: [phone]   Fax:     [phone]    Physical Therapy Treatment Note/ Progress Report:     Date:  2021    Patient Name:  Enoch Branch    :  1965  MRN: [id number]    Pertinent Medical History:      Medical/Treatment Diagnosis Information:  · Diagnosis: M54.5 (ICD-10-CM) - Low back pain  · Treatment Diagnosis: Back pain on left lumbar areas and hip. diffficutly with ADL's. limited mobility, weakness, difficulty with walking. Insurance/Certification information:  PT Insurance Information: [employer]  Physician Information:  Referring Practitioner: Pastor Hugo MD  Plan of care signed (Y/N): Sent to Dr. Lisa Schwarz on 21  Date of Patient follow up with Physician:      Progress Report: []  Yes  [x]  No     Date Range for reporting period:  Beginnin2021  Ending:    Progress report due (10 Rx/or 30 days whichever is less):     Recertification due (POC duration/ or 90 days whichever is less):    Visit # POC/ Insurance Allowable Auth Needed   6  []Yes    []No     Functional Scale:       Date Assessed: at eval  Test: [place]  Score: 61    Pain level:  1/10     History of Injury:Pt reports that pain began on May 9, 2021 when she woke up that morning. She has been walking dogs since last July and feels that  her pain may be related to the dogs pulling on the leash. She tends to hold the dog leash on the right side. Her pain is on the left low back. She also has OA on lumbar spine. Her pain is getting better. SUBJECTIVE:  21: Pt reports that she still feels like 5/10, but did note relief after the last session. 21: Pt reports that she is having a rough day  since she had a procedure and abdominal biopsy yesterday. 21: Patient reports back is feeling a little better,stiffness and soreness on left side.    21: Pt weekly - 10 reps - 3 sets    Therapeutic Exercise and NMR EXR  [x] (80228) Provided verbal/tactile cueing for activities related to strengthening, flexibility, endurance, ROM  for improvements in proximal hip and core control with self care, mobility, lifting and ambulation.  [] (67450) Provided verbal/tactile cueing for activities related to improving balance, coordination, kinesthetic sense, posture, motor skill, proprioception  to assist with core control in self care, mobility, lifting, and ambulation. Therapeutic Activities:    [x] (72459 or 38677) Provided verbal/tactile cueing for activities related to improving balance, coordination, kinesthetic sense, posture, motor skill, proprioception and motor activation to allow for proper function  with self care and ADLs  [] (40662) Provided training and instruction to the patient for proper core and proximal hip recruitment and positioning with ambulation re-education     Home Exercise Program:    [x] (03631) Reviewed/Progressed HEP activities related to strengthening, flexibility, endurance, ROM of core, proximal hip and LE for functional self-care, mobility, lifting and ambulation   [] (54746) Reviewed/Progressed HEP activities related to improving balance, coordination, kinesthetic sense, posture, motor skill, proprioception of core, proximal hip and LE for self care, mobility, lifting, and ambulation      Manual Treatments:  PROM / STM / Oscillations-Mobs:  G-I, II, III, IV (PA's, Inf., Post.)  [x] (28538) Provided manual therapy to mobilize proximal hip and LS spine soft tissue/joints for the purpose of modulating pain, promoting relaxation,  increasing ROM, reducing/eliminating soft tissue swelling/inflammation/restriction, improving soft tissue extensibility and allowing for proper ROM for normal function with self care, mobility, lifting and ambulation.      Approval Dates:  CPT Code Units Approved Units Used  Date Updated: Charges:  Timed Code Treatment Minutes: 45    Total Treatment Minutes: 60      [] EVAL (LOW) 23219 (typically 20 minutes face-to-face)  [] EVAL (MOD) 66242 (typically 30 minutes face-to-face)  [] EVAL (HIGH) 23822 (typically 45 minutes face-to-face)  [] RE-EVAL     [x] ZA(59763) x 1    [] Dry needle 1 or 2 Muscles (21039)  [x] NMR (34589) x  1   [] Dry needle 3+ Muscles (65427)  [x] Manual (34461) x  1   [] Ultrasound (83785) x  [] TA (34584) x     [] Mech Traction (05615)  [] ES(attended) (11799)     [x] ES (un) (42158): 15 min  [] Vasopump (90858) [] Ionto (00826)   [] Other:    GOALS:  Patient stated goal: \"To get to at least 1/10  or less  pain\"  []? Progressing: []? Met: []? Not Met: []? Adjusted  Therapist goals for Patient:   Short Term Goals: To be achieved in: 2 weeks  1. Independent in HEP and progression per patient tolerance, in order to prevent re-injury. []? Progressing: []? Met: []? Not Met: []? Adjusted  2. Patient will have a decrease in pain to facilitate improvement in movement, function, and ADLs as indicated by Functional Deficits. []? Progressing: []? Met: []? Not Met: []? Adjusted     Long Term Goals: To be achieved in: 4-6 weeks  1. Disability index score of 10 % or less for the TUTU to assist with reaching prior level of function. []? Progressing: []? Met: []? Not Met: []? Adjusted  2. Patient will demonstrate increased AROM to WNL, good LS mobility, good hip ROM to allow for proper joint functioning as indicated by patients Functional Deficits. []? Progressing: []? Met: []? Not Met: []? Adjusted  3. Patient will demonstrate an increase in Strength to good proximal hip and core activation to allow for proper functional mobility as indicated by patients Functional Deficits. []? Progressing: []? Met: []? Not Met: []? Adjusted  4. Patient will return to 90% functional activities without increased symptoms or restriction. []? Progressing: []? Met: []? Not Met: []? Adjusted  5.  Pt will be able to resume household chores without increase of symptoms (patient specific functional goal)    []? Progressing: []? Met: []? Not Met: []? Adjusted          ASSESSMENT:  Pt reports pain that started when she was walking dogs and they pulled her to the right, straining the left lumbar spine. Her referring MD indicated that he believes her pain is more muscular and her imaging shows some DDD and nerve compression as well. Pt tolerated treatment well and seemed to understand principles of proper body mechanics. Treatment/Activity Tolerance:  [x] Patient tolerated treatment well [] Patient limited by fatique  [] Patient limited by pain  [] Patient limited by other medical complications  [] Other:     Overall Progression Towards Functional goals/ Treatment Progress Update:  [] Patient is progressing as expected towards functional goals listed. [] Progression is slowed due to complexities/Impairments listed. [] Progression has been slowed due to co-morbidities. 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 Continue with manual therapy and modalities as needed. Add exercise. [x] Continue per plan of care [] Alter current plan (see comments)  [] Plan of care initiated [] Hold pending MD visit [] Discharge    Electronically signed by: Raymond Singh PTA    Note: If patient does not return for scheduled/recommended follow up visits, this note will serve as a discharge from care along with the most recent update on progress.

## 2021-06-29 ENCOUNTER — APPOINTMENT (OUTPATIENT)
Dept: PHYSICAL THERAPY | Age: 56
End: 2021-06-29
Payer: MEDICARE

## 2021-07-01 ENCOUNTER — HOSPITAL ENCOUNTER (OUTPATIENT)
Dept: PHYSICAL THERAPY | Age: 56
Setting detail: THERAPIES SERIES
Discharge: HOME | End: 2021-07-01
Payer: MEDICARE

## 2021-07-06 ENCOUNTER — HOSPITAL ENCOUNTER (OUTPATIENT)
Dept: PHYSICAL THERAPY | Age: 56
Setting detail: THERAPIES SERIES
Discharge: HOME | End: 2021-07-06
Payer: MEDICARE

## 2021-07-06 NOTE — FLOWSHEET NOTE
[address] [facility] [place] [address]  Phone: [phone]   Fax:     [phone]    Physical Therapy  Cancellation/No-show Note  Patient Name:  Britni Jackson  :  1965   Date:  2021  Cancelled visits to date: 2  No-shows to date: 0    Patient status for today's appointment patient:  [x]  Cancelled  []  Rescheduled appointment  []  No-show     Reason given by patient:  [x]  Patient ill  []  Conflicting appointment  []  No transportation    []  Conflict with work  []  No reason given  []  Other:     Comments:  Pt called us to cancel due to illness.     Phone call information:   []  Phone call made today to patient at _ time at number provided:      []  Patient answered, conversation as follows:    []  Patient did not answer, message left as follows:  []  Phone call not made today    Electronically signed by:  Maryuri Mcnally, SUNI

## 2021-07-07 ENCOUNTER — APPOINTMENT (OUTPATIENT)
Dept: GENERAL RADIOLOGY | Age: 56
End: 2021-07-07
Payer: MEDICARE

## 2021-07-07 ENCOUNTER — HOSPITAL ENCOUNTER (EMERGENCY)
Age: 56
Discharge: HOME | End: 2021-07-07
Attending: EMERGENCY MEDICINE
Payer: MEDICARE

## 2021-07-07 VITALS
HEIGHT: 64 IN | SYSTOLIC BLOOD PRESSURE: 141 MMHG | WEIGHT: 146 LBS | OXYGEN SATURATION: 100 % | TEMPERATURE: 98.4 F | BODY MASS INDEX: 24.92 KG/M2 | RESPIRATION RATE: 15 BRPM | DIASTOLIC BLOOD PRESSURE: 84 MMHG | HEART RATE: 72 BPM

## 2021-07-07 DIAGNOSIS — S93.512A SPRAIN OF INTERPHALANGEAL JOINT OF LEFT GREAT TOE, INITIAL ENCOUNTER: Primary | ICD-10-CM

## 2021-07-07 PROCEDURE — 73630 X-RAY EXAM OF FOOT: CPT

## 2021-07-07 PROCEDURE — 99282 EMERGENCY DEPT VISIT SF MDM: CPT

## 2021-07-07 RX ORDER — HYDROCODONE BITARTRATE AND ACETAMINOPHEN 5; 325 MG/1; MG/1
1 TABLET ORAL EVERY 4 HOURS
COMMUNITY
Start: 2021-05-21 | End: 2022-05-07

## 2021-07-07 RX ORDER — AMITRIPTYLINE HYDROCHLORIDE 25 MG/1
25 TABLET, FILM COATED ORAL NIGHTLY
COMMUNITY
End: 2022-05-07

## 2021-07-07 RX ORDER — POTASSIUM CHLORIDE 750 MG/1
1 TABLET, FILM COATED, EXTENDED RELEASE ORAL DAILY
COMMUNITY
Start: 2021-04-02 | End: 2022-05-07

## 2021-07-07 RX ORDER — CEVIMELINE HYDROCHLORIDE 30 MG/1
30 CAPSULE ORAL 3 TIMES DAILY
COMMUNITY
Start: 2021-07-01

## 2021-07-07 RX ORDER — MIDODRINE HYDROCHLORIDE 5 MG/1
1 TABLET ORAL DAILY
COMMUNITY
Start: 2021-06-11 | End: 2022-05-07

## 2021-07-07 RX ORDER — CYCLOSPORINE 0.5 MG/ML
1 EMULSION OPHTHALMIC DAILY
COMMUNITY
Start: 2018-02-07 | End: 2022-05-07

## 2021-07-07 RX ORDER — DULOXETIN HYDROCHLORIDE 60 MG/1
120 CAPSULE, DELAYED RELEASE ORAL DAILY
COMMUNITY
Start: 2018-08-20

## 2021-07-07 RX ORDER — PANTOPRAZOLE SODIUM 40 MG/1
1 TABLET, DELAYED RELEASE ORAL DAILY
COMMUNITY
Start: 2021-06-11

## 2021-07-07 RX ORDER — GABAPENTIN 100 MG/1
200 CAPSULE ORAL 4 TIMES DAILY
COMMUNITY
End: 2022-05-07

## 2021-07-07 RX ORDER — DONEPEZIL HYDROCHLORIDE 5 MG/1
5 TABLET, ORALLY DISINTEGRATING ORAL NIGHTLY
COMMUNITY
Start: 2021-06-24 | End: 2022-05-07

## 2021-07-07 RX ORDER — ZOLPIDEM TARTRATE 10 MG/1
10 TABLET ORAL NIGHTLY
COMMUNITY

## 2021-07-07 ASSESSMENT — PAIN DESCRIPTION - ORIENTATION: ORIENTATION: LEFT

## 2021-07-07 ASSESSMENT — PAIN DESCRIPTION - LOCATION: LOCATION: TOE (COMMENT WHICH ONE)

## 2021-07-07 NOTE — ED PROVIDER NOTES
[facility] Emergency Department      Pt Name: Taryn Rhodes  MRN: [id number]  [place] 1965  Date of evaluation: 2021  Provider: Lucinda Ordonez MD  CHIEF COMPLAINT  Chief Complaint   Patient presents with    Toe Pain     HPI  Taryn Rhodes is a 55 y.o. female who presents because of left great toe pain. She stubbed her toe on  which was 3 days ago. She says her nails look fine afterwards and she actually had a pedicure yesterday. Pain is worse when she walks. She denies any other injury. REVIEW OF SYSTEMS:  No other injury, swelling minimal, no sensation loss Pertinent positives and negatives as per the HPI. All other review of systems reviewed and negative. Nursing notes reviewed. PAST MEDICAL HISTORY  Past Medical History:   Diagnosis Date    Arthritis     Frequency of urination     GERD (gastroesophageal reflux disease)     Hypertension     has not taken meds for 2 weeks    Migraines, neuralgic     Pancreatitis      SURGICAL HISTORY  Past Surgical History:   Procedure Laterality Date    BUNIONECTOMY       SECTION      CHOLECYSTECTOMY      COLECTOMY      ERCP      STENT, MULTIPLE    ERCP  2011     STENT REMOVAL, 5 FR 5 SM STENT PLACEMENT    ERCP      HYSTERECTOMY      JOINT REPLACEMENT      left knee    TUBAL LIGATION      UPPER GASTROINTESTINAL ENDOSCOPY  13    WITH BIOPSY AND BOTOX INJECTION    UPPER GASTROINTESTINAL ENDOSCOPY  3/25/14    with botox injection    UPPER GASTROINTESTINAL ENDOSCOPY  10-24-14    Esophagogastroduodenoscopy with Botulinum toxin injection of the pylorus    UPPER GASTROINTESTINAL ENDOSCOPY  3/24/15    WITH BOTOX INJECTION     MEDICATIONS:  No current facility-administered medications on file prior to encounter.      Current Outpatient Medications on File Prior to Encounter   Medication Sig Dispense Refill    cevimeline (EVOXAC) 30 MG capsule Take 30 mg by mouth 3 times daily      cycloSPORINE (RESTASIS) 0.05 % ophthalmic emulsion Apply 1 drop to eye daily      donepezil (ARICEPT ODT) 5 MG disintegrating tablet Take 5 mg by mouth nightly      DULoxetine (CYMBALTA) 30 MG extended release capsule Take 30 mg by mouth daily      cyclobenzaprine (FLEXERIL) 10 MG tablet Take 1 tablet by mouth 3 times daily as needed for Muscle spasms 21 tablet 0    melatonin 3 MG TABS tablet Take 3 mg by mouth daily.  estradiol (ESTRACE) 0.5 MG tablet Take 0.5 mg by mouth daily.  acyclovir (ZOVIRAX) 200 MG capsule Take 400 mg by mouth 2 times daily.  amylase-lipase-protease (LIPRAM-CR10) 33.2-10-37.5 MU per capsule Take 3 capsules by mouth 3 times daily (with meals).  amitriptyline (ELAVIL) 25 MG tablet Take 25 mg by mouth nightly      gabapentin (NEURONTIN) 100 MG capsule Take 200 mg by mouth 4 times daily.  HYDROcodone-acetaminophen (NORCO) 5-325 MG per tablet Take 1 tablet by mouth every 4 hours.  midodrine (PROAMATINE) 5 MG tablet Take 1 tablet by mouth daily      pantoprazole (PROTONIX) 40 MG tablet Take 1 tablet by mouth daily      potassium chloride (KLOR-CON) 10 MEQ extended release tablet Take 1 tablet by mouth daily      zolpidem (AMBIEN) 10 MG tablet Take 10 mg by mouth nightly.  naproxen (NAPROSYN) 500 MG tablet Take 1 tablet by mouth 2 times daily (with meals) for 7 days 14 tablet 0    traZODone (DESYREL) 50 MG tablet Take 50 mg by mouth nightly. ALLERGIES  Morphine and Vicodin [hydrocodone-acetaminophen]  SOCIAL HISTORY:  Social History     Tobacco Use    Smoking status: Never Smoker    Smokeless tobacco: Never Used   Substance Use Topics    Alcohol use: No    Drug use: No     IMMUNIZATIONS:  Noncontributory    PHYSICAL EXAM  VITAL SIGNS:  Blood pressure (!) 141/84, pulse 72, temperature 98.4 °F (36.9 °C), temperature source Oral, resp. rate 15, height 5' 4\" (1.626 m), weight 146 lb (66.2 kg), SpO2 100 %.   Constitutional:  55 y.o. female who does not appear toxic  HENT: Atraumatic, mucous membranes moist  Eyes:   Conjunctiva clear, no icterus  Neck:  Supple, no signs of injury  Thorax & Lungs:  Respiratory effort normal  Abdomen:  Non distended  Back:  No deformity  Extremity: Toe appears to be normal, there is some mild tenderness at the base of the great toe, no visible bruising, she does have thick nail polish on but the nails appear to be uninjured, peripheral sensation is normal, ankle is nontender  Skin:  Warm, dry    DIAGNOSTIC RESULTS:    RADIOLOGY:    Plain x-rays were viewed by me:   XR FOOT LEFT (MIN 3 VIEWS)   Final Result   1. No acute fracture or dislocation        ED COURSE:  Uneventful     PROCEDURES:  None    CONSULTATIONS:  None    MEDICAL DECISION MAKING: Lyn Briscoe is a 55 y.o. female who presented because of a painful toe. Images are negative for acute fracture and we have provided soft tissue pain instructions. No suspicion for occult neurovascular injury or compartment syndrome. Lyn Briscoe was given appropriate discharge instructions. Referral to follow up provider. Differential Diagnosis:  Fracture, sprain, neurovascular injury, infection, other    FOLLOW UP:    MD Verónica Rodriguez  [address] [place] [address]  [phone]          FINAL IMPRESSION:    1. Sprain of interphalangeal joint of left great toe, initial encounter        (Please note that I used voice recognition software to generate this note.   Occasionally words are mistranscribed despite my efforts to edit errors.)        Curtis Jin MD  07/07/21 6401

## 2021-07-08 ENCOUNTER — HOSPITAL ENCOUNTER (OUTPATIENT)
Dept: PHYSICAL THERAPY | Age: 56
Setting detail: THERAPIES SERIES
Discharge: HOME | End: 2021-07-08
Payer: MEDICARE

## 2021-07-08 PROCEDURE — 97112 NEUROMUSCULAR REEDUCATION: CPT

## 2021-07-08 PROCEDURE — G0283 ELEC STIM OTHER THAN WOUND: HCPCS

## 2021-07-08 PROCEDURE — 97140 MANUAL THERAPY 1/> REGIONS: CPT

## 2021-07-08 PROCEDURE — 97110 THERAPEUTIC EXERCISES: CPT

## 2021-07-08 NOTE — FLOWSHEET NOTE
[facility] [place] [address]  Phone: [phone]   Fax:     [phone]    Physical Therapy Treatment Note/ Progress Report:     Date:  2021    Patient Name:  Jie Elizabeth    :  1965  MRN: [id number]    Pertinent Medical History:      Medical/Treatment Diagnosis Information:  · Diagnosis: M54.5 (ICD-10-CM) - Low back pain  · Treatment Diagnosis: Back pain on left lumbar areas and hip. diffficutly with ADL's. limited mobility, weakness, difficulty with walking. Insurance/Certification information:  PT Insurance Information: [employer]  Physician Information:  Referring Practitioner: Rogerio Cooper MD  Plan of care signed (Y/N): Sent to Dr. Albert Wen on 21  Date of Patient follow up with Physician:      Progress Report: []  Yes  [x]  No     Date Range for reporting period:  Beginnin2021  Ending:    Progress report due (10 Rx/or 30 days whichever is less):     Recertification due (POC duration/ or 90 days whichever is less):    Visit # POC/ Insurance Allowable Auth Needed   7  []Yes    []No     Functional Scale:       Date Assessed: at eval  Test: [place]  Score: 61    Pain level:  1.25/10     History of Injury:Pt reports that pain began on May 9, 2021 when she woke up that morning. She has been walking dogs since last July and feels that  her pain may be related to the dogs pulling on the leash. She tends to hold the dog leash on the right side. Her pain is on the left low back. She also has OA on lumbar spine. Her pain is getting better. SUBJECTIVE:  21: Pt reports that she still feels like 5/10, but did note relief after the last session. 21: Pt reports that she is having a rough day  since she had a procedure and abdominal biopsy yesterday. 21: Patient reports back is feeling a little better,stiffness and soreness on left side.    21: Pt reports pain is on left low back, no leg pain. Pain is very minimal today (1/10). She had a busy day with her grandson yesterday. 06/28/21: Patient reports pain intensity is decreasing. 7/8/21: Pt reports that her pain is still decreasing. OBJECTIVE: See eval   Observation:    Test measurements:      RESTRICTIONS/PRECAUTIONS: OA and RA    Exercises/Interventions:     Therapeutic Ex (70888)   Min: Resistance/Reps Notes/Cues    30 sec x 3 Added 6/16/21   LTR 2 min Added 6/16/21   PPT 5 sec x 10 Added 6/16   Bridging 5 sec x 10 Added 6/16   Clamshells 20X Added 6/16   Nu step 5 min    PPT with OH Pulldown Blue x 10  Added 6/28   PPT with bicycling in the air Attempted,  but held due to   difficulty 7/8/21        Manual Intervention (48467) Min:      Right sidelying STM to left paraspinals,   IASTM to left lumbar area and hip         NMR re-education (96237)   Min:     Mf Activation- re-ed     TrA Re-ed activation          Glute Max re-ed activation       Omit due to increased back pain    SLS 5 sec x 10 on airex     DD with 10 minisquats     Wobble board with activation of core 2 min    Therapeutic Activity (90262) Min:               Modalities  Min:       IFC with heat while right sidelying x 15 min (3 electrodes on left, one on right      Other Therapeutic Activities:  Pt was educated on PT POC, Diagnosis, Prognosis, pathomechanics as well as frequency and duration of scheduling future physical therapy appointments. Time was also taken on this day to answer all patient questions and participation in PT. Reviewed appointment policy in detail with patient and patient verbalized understanding. Home Exercise Program:   Access Code: [email or web address]  URL: [email or web address]  Date: 06/16/2021  Prepared by:  [facility]    Exercises  Supine Bridge - 1 x daily - 7 x weekly - 10 reps - 3 sets  Clamshell - 1 x daily - 7 x weekly - 10 reps - 3 sets  Supine Lower Trunk Rotation - 1 x daily - 7 x weekly - 10 reps - 3 sets  Supine Posterior Pelvic Tilt - 1 x daily - 7 x weekly - 10 reps - 3 sets  Seated Sidebending - 1 x daily - 7 x weekly - 10 reps - 3 sets    Therapeutic Exercise and NMR EXR  [x] (21576) Provided verbal/tactile cueing for activities related to strengthening, flexibility, endurance, ROM  for improvements in proximal hip and core control with self care, mobility, lifting and ambulation.  [] (94941) Provided verbal/tactile cueing for activities related to improving balance, coordination, kinesthetic sense, posture, motor skill, proprioception  to assist with core control in self care, mobility, lifting, and ambulation.      Therapeutic Activities:    [x] (28562 or 98343) Provided verbal/tactile cueing for activities related to improving balance, coordination, kinesthetic sense, posture, motor skill, proprioception and motor activation to allow for proper function  with self care and ADLs  [] (89236) Provided training and instruction to the patient for proper core and proximal hip recruitment and positioning with ambulation re-education     Home Exercise Program:    [x] (56770) Reviewed/Progressed HEP activities related to strengthening, flexibility, endurance, ROM of core, proximal hip and LE for functional self-care, mobility, lifting and ambulation   [] (46095) Reviewed/Progressed HEP activities related to improving balance, coordination, kinesthetic sense, posture, motor skill, proprioception of core, proximal hip and LE for self care, mobility, lifting, and ambulation      Manual Treatments:  PROM / STM / Oscillations-Mobs:  G-I, II, III, IV (PA's, Inf., Post.)  [x] (84085) Provided manual therapy to mobilize proximal hip and LS spine soft tissue/joints for the purpose of modulating pain, promoting relaxation,  increasing ROM, reducing/eliminating soft tissue swelling/inflammation/restriction, improving soft tissue extensibility and allowing for proper ROM for normal function with self care, mobility, activities without increased symptoms or restriction. []? Progressing: []? Met: []? Not Met: []? Adjusted  5. Pt will be able to resume household chores without increase of symptoms (patient specific functional goal)    []? Progressing: []? Met: []? Not Met: []? Adjusted          ASSESSMENT:    Treatment/Activity Tolerance:  [x] Patient tolerated treatment well [] Patient limited by fatique  [] Patient limited by pain  [] Patient limited by other medical complications  [] Other:     Overall Progression Towards Functional goals/ Treatment Progress Update:  [x] Patient is progressing as expected towards functional goals listed. [] Progression is slowed due to complexities/Impairments listed. [] Progression has been slowed due to co-morbidities. [] Plan just implemented, too soon to assess goals progression <30days   [] Goals require adjustment due to lack of progress  [] Patient is not progressing as expected and requires additional follow up with physician  [] Other:    Prognosis for POC: [x] Good [] Fair  [] Poor    Patient requires continued skilled intervention: [x] Yes  [] No        PLAN:  Plan for DC tomorrow. [x] Continue per plan of care [] Alter current plan (see comments)  [] Plan of care initiated [] Hold pending MD visit [] Discharge    Electronically signed by: Janeth Oneil, PT    Note: If patient does not return for scheduled/recommended follow up visits, this note will serve as a discharge from care along with the most recent update on progress.

## 2021-07-09 ENCOUNTER — HOSPITAL ENCOUNTER (OUTPATIENT)
Dept: PHYSICAL THERAPY | Age: 56
Setting detail: THERAPIES SERIES
Discharge: HOME | End: 2021-07-09
Payer: MEDICARE

## 2021-07-09 PROCEDURE — 97140 MANUAL THERAPY 1/> REGIONS: CPT

## 2021-07-09 PROCEDURE — 97530 THERAPEUTIC ACTIVITIES: CPT

## 2021-07-09 PROCEDURE — G0283 ELEC STIM OTHER THAN WOUND: HCPCS

## 2021-07-09 ASSESSMENT — PAIN SCALES - QUEBEC BACK PAIN DISABILITY SCALE
STAND UP FOR 20 TO 30 MINUTES: 4
TOTAL SCORE: 36
WALK SEVERAL KILOMETERS  OR MILES: 5
CLIMB ONE FLIGHT OF STAIRS: 0
PULL OR PUSH HEAVY DOORS: 4
WALK A FEW BLOCKS OR 300 TO 400M: 4
BEND OVER TO CLEAN THE BATHTUB: 0
MAKE YOUR BED: 0
MOVE A CHAIR: 0
THROW A BALL: 0
QUEBEC CMS MODIFIER: CJ
GET OUT OF BED: 0
SLEEP THROUGH THE NIGHT: 4
QUEBEC DISABILITY INDEX: 20-39%
LIFT AND CARRY A HEAVY SUITCASE: 5
SIT IN A CHAIR FOR SEVERAL HOURS: 4
REACH UP TO HIGH SHELVES: 0
CARRY TWO BAGS OF GROCERIES: 1
RIDE IN A CAR: 0
PUT ON SOCKS OR PANYHOSE: 0
RUN ONE BLOCK OR 100M: 5
TURN OVER IN BED: 0
TAKE FOOD OUT OF THE REFRIGERATOR: 0

## 2021-07-09 NOTE — PROGRESS NOTES
[address] [facility]. Leonard Chang [address]  Phone: [phone]   Fax:     [phone]         Physical Therapy Discharge Summary    Dear  Dr. Eamon Altman,    We had the pleasure of treating the following patient for physical therapy services at [address] [place] [address]. A summary of our findings can be found in the discharge summary below. If you have any questions or concerns regarding these findings, please do not hesitate to contact me at the office phone number above. Thank you for the referral.     Physician Signature:________________________________Date:__________________  By signing above (or electronic signature), therapists plan is approved by physician      Overall Response to Treatment:   [x]Patient is responding well to treatment and improvement is noted with regards  to goals   []Patient should continue to improve in reasonable time if they continue HEP   []Patient has plateaued and is no longer responding to skilled PT intervention    []Patient is getting worse and would benefit from return to referring MD   []Patient unable to adhere to initial POC   []Other:     Date range of Visits: 21-21  Total Visits: 8      Date:  2021    Patient Name:  Ramiro Caceres    :  1965  MRN: [id number]    Pertinent Medical History:      Medical/Treatment Diagnosis Information:  · Diagnosis: M54.5 (ICD-10-CM) - Low back pain  · Treatment Diagnosis: Back pain on left lumbar areas and hip. diffficutly with ADL's. limited mobility, weakness, difficulty with walking.     Insurance/Certification information:  PT Insurance Information: [employer]  Physician Information:  Referring Practitioner: Kari Underwood MD  Plan of care signed (Y/N): Sent to Dr. aEmon Altman on 21  Date of Patient follow up with Physician:      Progress Report: [x]  Yes  []  No     Date Range for reporting period:  Beginnin2021  Endin21  Progress report due (10 Rx/or 30 days whichever is less):     Recertification due (POC duration/ or 90 days whichever is less):    Visit # POC/ Insurance Allowable Auth Needed   8  []Yes    []No     Functional Scale:       Date Assessed: at D/C  Test: [place]  Score: 36 (20-39%)    Pain level:  1.25/10     History of Injury:Pt reports that pain began on May 9, 2021 when she woke up that morning. She has been walking dogs since last July and feels that  her pain may be related to the dogs pulling on the leash. She tends to hold the dog leash on the right side. Her pain is on the left low back. She also has OA on lumbar spine. Her pain is getting better. SUBJECTIVE:  21: Pt reports that she still feels like 5/10, but did note relief after the last session. 21: Pt reports that she is having a rough day  since she had a procedure and abdominal biopsy yesterday. 21: Patient reports back is feeling a little better,stiffness and soreness on left side. 21: Pt reports pain is on left low back, no leg pain. Pain is very minimal today (1/10). She had a busy day with her grandson yesterday. 21: Patient reports pain intensity is decreasing. 21: Pt reports that her pain is still decreasing. 21: Pt reports that her pain is very much like the pain yesterday.  Very minimal.    OBJECTIVE:    Observation:    Test measurements:  Trunk ROM: WNL Flex: to toes, Ext: 40  SB L:  43  SB R:  34    Strength: Abdominal: 3/5   LE's: 5/5 grossly    RESTRICTIONS/PRECAUTIONS: OA and RA    Exercises/Interventions:     Therapeutic Ex ()   Min: Resistance/Reps Notes/Cues   Seated lateral flexion to right 30 sec x 3 Added 21   LTR 2 min Added 21   PPT 5 sec x 10 Added    Bridging 5 sec x 10 Added    Clamshells 20X Added    Nu step 5 min    PPT with OH Pulldown Blue x 10  Added    PPT with bicycling in the air Attempted,  but held due to   difficulty 7/8/21        Manual Intervention (50594) Min:      Right sidelying STM to left paraspinals,   IASTM to left lumbar area and hip         NMR re-education (53047)   Min:     Mf Activation- re-ed     TrA Re-ed activation          Glute Max re-ed activation       Omit due to increased back pain               Therapeutic Activity (07066) Min:           Reviewed Bradly Campbell goals, and took measurements    Modalities  Min:       IFC with heat while right sidelying x 15 min (3 electrodes on left, one on right      Other Therapeutic Activities:  Pt was educated on PT POC, Diagnosis, Prognosis, pathomechanics as well as frequency and duration of scheduling future physical therapy appointments. Time was also taken on this day to answer all patient questions and participation in PT. Reviewed appointment policy in detail with patient and patient verbalized understanding. Home Exercise Program:   Access Code: [email or web address]  URL: Measy/  Date: 06/16/2021  Prepared by: Hermelinda Bi    Exercises  Supine Bridge - 1 x daily - 7 x weekly - 10 reps - 3 sets  Clamshell - 1 x daily - 7 x weekly - 10 reps - 3 sets  Supine Lower Trunk Rotation - 1 x daily - 7 x weekly - 10 reps - 3 sets  Supine Posterior Pelvic Tilt - 1 x daily - 7 x weekly - 10 reps - 3 sets  Seated Sidebending - 1 x daily - 7 x weekly - 10 reps - 3 sets     7/8/21: Gave another copy of HEP, as pt is not sure where her other copy is. Therapeutic Exercise and NMR EXR  [x] (07745) Provided verbal/tactile cueing for activities related to strengthening, flexibility, endurance, ROM  for improvements in proximal hip and core control with self care, mobility, lifting and ambulation.  [] (00096) Provided verbal/tactile cueing for activities related to improving balance, coordination, kinesthetic sense, posture, motor skill, proprioception  to assist with core control in self care, mobility, lifting, and ambulation.      Therapeutic Activities:    [x] (08049 or 96579) Provided verbal/tactile cueing for activities related to improving balance, coordination, kinesthetic sense, posture, motor skill, proprioception and motor activation to allow for proper function  with self care and ADLs  [] (64457) Provided training and instruction to the patient for proper core and proximal hip recruitment and positioning with ambulation re-education     Home Exercise Program:    [x] (89056) Reviewed/Progressed HEP activities related to strengthening, flexibility, endurance, ROM of core, proximal hip and LE for functional self-care, mobility, lifting and ambulation   [] (29471) Reviewed/Progressed HEP activities related to improving balance, coordination, kinesthetic sense, posture, motor skill, proprioception of core, proximal hip and LE for self care, mobility, lifting, and ambulation      Manual Treatments:  PROM / STM / Oscillations-Mobs:  G-I, II, III, IV (PA's, Inf., Post.)  [x] (69559) Provided manual therapy to mobilize proximal hip and LS spine soft tissue/joints for the purpose of modulating pain, promoting relaxation,  increasing ROM, reducing/eliminating soft tissue swelling/inflammation/restriction, improving soft tissue extensibility and allowing for proper ROM for normal function with self care, mobility, lifting and ambulation.      Approval Dates:  CPT Code Units Approved Units Used  Date Updated:                     Charges:  Timed Code Treatment Minutes: 35    Total Treatment Minutes: 50      [] EVAL (LOW) 60987 (typically 20 minutes face-to-face)  [] EVAL (MOD) 67790 (typically 30 minutes face-to-face)  [] EVAL (HIGH) 89607 (typically 45 minutes face-to-face)  [] RE-EVAL     [] QG(56632) x 1    [] Dry needle 1 or 2 Muscles (22806)  [] NMR (96463) x  1   [] Dry needle 3+ Muscles (11235)  [x] Manual (91435) x  1   [] Ultrasound (91704) x  [x] TA (28484) x 1    [] Mech Traction (43100)  [] ES(attended) (48681)     [x] ES (un) (59095): 15 min  [] mechanics. Treatment/Activity Tolerance:  [x] Patient tolerated treatment well [] Patient limited by fatique  [] Patient limited by pain  [] Patient limited by other medical complications  [] Other:     Overall Progression Towards Functional goals/ Treatment Progress Update:  [x] Patient is progressing as expected towards functional goals listed. [] Progression is slowed due to complexities/Impairments listed. [] Progression has been slowed due to co-morbidities. [] Plan just implemented, too soon to assess goals progression <30days   [] Goals require adjustment due to lack of progress  [] Patient is not progressing as expected and requires additional follow up with physician  [] Other:    Prognosis for POC: [x] Good [] Fair  [] Poor    Patient requires continued skilled intervention: [] Yes  [x] No        PLAN:  DC from PT. Continue with HEP. [] Continue per plan of care [] Alter current plan (see comments)  [] Plan of care initiated [] Hold pending MD visit [x] Discharge    Electronically signed by: Estella Ghosh PT    Note: If patient does not return for scheduled/recommended follow up visits, this note will serve as a discharge from care along with the most recent update on progress.

## 2022-02-08 ENCOUNTER — HOSPITAL ENCOUNTER (EMERGENCY)
Age: 57
Discharge: HOME | End: 2022-02-08
Payer: MEDICARE

## 2022-02-08 VITALS
OXYGEN SATURATION: 97 % | SYSTOLIC BLOOD PRESSURE: 135 MMHG | DIASTOLIC BLOOD PRESSURE: 89 MMHG | BODY MASS INDEX: 22.85 KG/M2 | HEIGHT: 64 IN | HEART RATE: 92 BPM | RESPIRATION RATE: 17 BRPM | TEMPERATURE: 98.5 F | WEIGHT: 133.82 LBS

## 2022-02-08 DIAGNOSIS — B34.9 VIRAL ILLNESS: Primary | ICD-10-CM

## 2022-02-08 PROCEDURE — U0003 INFECTIOUS AGENT DETECTION BY NUCLEIC ACID (DNA OR RNA); SEVERE ACUTE RESPIRATORY SYNDROME CORONAVIRUS 2 (SARS-COV-2) (CORONAVIRUS DISEASE [COVID-19]), AMPLIFIED PROBE TECHNIQUE, MAKING USE OF HIGH THROUGHPUT TECHNOLOGIES AS DESCRIBED BY CMS-2020-01-R: HCPCS

## 2022-02-08 PROCEDURE — 6360000002 HC RX W HCPCS: Performed by: PHYSICIAN ASSISTANT

## 2022-02-08 PROCEDURE — 96372 THER/PROPH/DIAG INJ SC/IM: CPT

## 2022-02-08 PROCEDURE — U0005 INFEC AGEN DETEC AMPLI PROBE: HCPCS

## 2022-02-08 PROCEDURE — 99282 EMERGENCY DEPT VISIT SF MDM: CPT

## 2022-02-08 RX ORDER — ACETAMINOPHEN 500 MG
1000 TABLET ORAL EVERY 6 HOURS PRN
Qty: 40 TABLET | Refills: 0 | Status: SHIPPED | OUTPATIENT
Start: 2022-02-08 | End: 2022-05-07

## 2022-02-08 RX ORDER — PROMETHAZINE HYDROCHLORIDE 25 MG/1
25 TABLET ORAL EVERY 6 HOURS PRN
Qty: 20 TABLET | Refills: 0 | Status: SHIPPED | OUTPATIENT
Start: 2022-02-08 | End: 2022-02-15

## 2022-02-08 RX ORDER — KETOROLAC TROMETHAMINE 30 MG/ML
30 INJECTION, SOLUTION INTRAMUSCULAR; INTRAVENOUS ONCE
Status: COMPLETED | OUTPATIENT
Start: 2022-02-08 | End: 2022-02-08

## 2022-02-08 RX ADMIN — KETOROLAC TROMETHAMINE 30 MG: 30 INJECTION, SOLUTION INTRAMUSCULAR at 17:25

## 2022-02-08 ASSESSMENT — ENCOUNTER SYMPTOMS
NAUSEA: 1
WHEEZING: 0
SORE THROAT: 1
CONSTIPATION: 0
EYE REDNESS: 0
SHORTNESS OF BREATH: 0
COUGH: 0
DIARRHEA: 0
VOMITING: 0
BACK PAIN: 0
ABDOMINAL PAIN: 0
EYE PAIN: 0

## 2022-02-08 ASSESSMENT — PAIN DESCRIPTION - LOCATION: LOCATION: GENERALIZED

## 2022-02-08 ASSESSMENT — PAIN DESCRIPTION - DESCRIPTORS: DESCRIPTORS: ACHING

## 2022-02-08 ASSESSMENT — PAIN SCALES - WONG BAKER: WONGBAKER_NUMERICALRESPONSE: 8

## 2022-02-08 ASSESSMENT — PAIN DESCRIPTION - PAIN TYPE: TYPE: ACUTE PAIN

## 2022-02-08 ASSESSMENT — PAIN SCALES - GENERAL
PAINLEVEL_OUTOF10: 9
PAINLEVEL_OUTOF10: 10
PAINLEVEL_OUTOF10: 10

## 2022-02-08 NOTE — ED PROVIDER NOTES
[address] [facility]        Pt Name: Harris Sandoval  MRN: [id number]  [place] 1965  Date of evaluation: 2/8/2022  Provider: Jaylene Wallace PA-C  PCP: Cozette Cogan, MD  Note Started: 5:18 PM EST      DIPIKA. I have evaluated this patient. My supervising physician was available for consultation. Triage CHIEF COMPLAINT       Chief Complaint   Patient presents with    Pharyngitis     patient reports nausea, sore throat and generalized body aches starting saturday.  Generalized Body Aches         HISTORY OF PRESENT ILLNESS   (Location/Symptom, Timing/Onset, Context/Setting, Quality, Duration, Modifying Factors, Severity)  Note limiting factors. Chief Complaint: Sore throat, body aches    Harris Snadoval is a 55 y.o. female who presents to ED with sore throat, headache, body aches that started 4 days ago. She has associated nausea and chills. She was prescribed Zofran by PCP with mild relief. She has not taken any ibuprofen or Tylenol for her body aches. She denies fevers, chest pain, SOB, vomiting. Patient has an ostomy bag and states she has a decrease in stool, however she has not been drinking many fluids. She states she has been able to eat without difficulty. Nursing Notes were all reviewed and agreed with or any disagreements were addressed in the HPI. REVIEW OF SYSTEMS    (2-9 systems for level 4, 10 or more for level 5)     Review of Systems   Constitutional: Positive for chills. Negative for fever. HENT: Positive for sore throat. Negative for congestion. Eyes: Negative for pain and redness. Respiratory: Negative for cough, shortness of breath and wheezing. Cardiovascular: Negative for chest pain and leg swelling. Gastrointestinal: Positive for nausea. Negative for abdominal pain, constipation, diarrhea and vomiting. Genitourinary: Negative for dysuria and hematuria.    Musculoskeletal: Positive for myalgias. Negative for back pain and neck pain. Skin: Negative for rash and wound. Neurological: Positive for headaches. Negative for light-headedness. PAST MEDICAL HISTORY     Past Medical History:   Diagnosis Date    Arthritis     Frequency of urination     GERD (gastroesophageal reflux disease)     Hypertension     has not taken meds for 2 weeks    Migraines, neuralgic     Pancreatitis        SURGICAL HISTORY     Past Surgical History:   Procedure Laterality Date    BUNIONECTOMY       SECTION      CHOLECYSTECTOMY      COLECTOMY      ERCP      STENT, MULTIPLE    ERCP  2011     STENT REMOVAL, 5 FR 5 SM STENT PLACEMENT    ERCP      HYSTERECTOMY      JOINT REPLACEMENT      left knee    TUBAL LIGATION      UPPER GASTROINTESTINAL ENDOSCOPY  13    WITH BIOPSY AND BOTOX INJECTION    UPPER GASTROINTESTINAL ENDOSCOPY  3/25/14    with botox injection    UPPER GASTROINTESTINAL ENDOSCOPY  10-24-14    Esophagogastroduodenoscopy with Botulinum toxin injection of the pylorus    UPPER GASTROINTESTINAL ENDOSCOPY  3/24/15    WITH BOTOX INJECTION       CURRENTMEDICATIONS       Previous Medications    ACYCLOVIR (ZOVIRAX) 200 MG CAPSULE    Take 400 mg by mouth 2 times daily. AMITRIPTYLINE (ELAVIL) 25 MG TABLET    Take 25 mg by mouth nightly    AMYLASE-LIPASE-PROTEASE (LIPRAM-CR10) 33.2-10-37.5 MU PER CAPSULE    Take 3 capsules by mouth 3 times daily (with meals). CEVIMELINE (EVOXAC) 30 MG CAPSULE    Take 30 mg by mouth 3 times daily    CYCLOBENZAPRINE (FLEXERIL) 10 MG TABLET    Take 1 tablet by mouth 3 times daily as needed for Muscle spasms    CYCLOSPORINE (RESTASIS) 0.05 % OPHTHALMIC EMULSION    Apply 1 drop to eye daily    DONEPEZIL (ARICEPT ODT) 5 MG DISINTEGRATING TABLET    Take 5 mg by mouth nightly    DULOXETINE (CYMBALTA) 30 MG EXTENDED RELEASE CAPSULE    Take 30 mg by mouth daily    ESTRADIOL (ESTRACE) 0.5 MG TABLET    Take 0.5 mg by mouth daily. GABAPENTIN (NEURONTIN) 100 MG CAPSULE    Take 200 mg by mouth 4 times daily. HYDROCODONE-ACETAMINOPHEN (NORCO) 5-325 MG PER TABLET    Take 1 tablet by mouth every 4 hours. MELATONIN 3 MG TABS TABLET    Take 3 mg by mouth daily. MIDODRINE (PROAMATINE) 5 MG TABLET    Take 1 tablet by mouth daily    NAPROXEN (NAPROSYN) 500 MG TABLET    Take 1 tablet by mouth 2 times daily (with meals) for 7 days    PANTOPRAZOLE (PROTONIX) 40 MG TABLET    Take 1 tablet by mouth daily    POTASSIUM CHLORIDE (KLOR-CON) 10 MEQ EXTENDED RELEASE TABLET    Take 1 tablet by mouth daily    TRAZODONE (DESYREL) 50 MG TABLET    Take 50 mg by mouth nightly. ZOLPIDEM (AMBIEN) 10 MG TABLET    Take 10 mg by mouth nightly. ALLERGIES     Morphine and Vicodin [hydrocodone-acetaminophen]    FAMILYHISTORY       Family History   Problem Relation Age of Onset    Diabetes Mother     Cancer Mother     Arthritis Mother     High Blood Pressure Mother     High Blood Pressure Sister     Cancer Maternal Aunt     Diabetes Maternal Grandmother     Anesth Problems Neg Hx     Malig Hyperten Neg Hx     Hypotension Neg Hx     Malig Hypertherm Neg Hx     Pseudochol.  Deficiency Neg Hx         SOCIAL HISTORY       Social History     Socioeconomic History    Marital status:      Spouse name: None    Number of children: None    Years of education: None    Highest education level: None   Occupational History    None   Tobacco Use    Smoking status: Never Smoker    Smokeless tobacco: Never Used   Substance and Sexual Activity    Alcohol use: No    Drug use: No    Sexual activity: None   Other Topics Concern    None   Social History Narrative    None     Social Determinants of Health     Financial Resource Strain:     Difficulty of Paying Living Expenses: Not on file   Food Insecurity:     Worried About Running Out of Food in the Last Year: Not on file    Abimael of Food in the Last Year: Not on file   Transportation Needs:  Lack of Transportation (Medical): Not on file    Lack of Transportation (Non-Medical): Not on file   Physical Activity:     Days of Exercise per Week: Not on file    Minutes of Exercise per Session: Not on file   Stress:     Feeling of Stress : Not on file   Social Connections:     Frequency of Communication with Friends and Family: Not on file    Frequency of Social Gatherings with Friends and Family: Not on file    Attends Mandaeism Services: Not on file    Active Member of [address] [place] [address] or Organizations: Not on file    Attends Club or Organization Meetings: Not on file    Marital Status: Not on file   Intimate Partner Violence:     Fear of Current or Ex-Partner: Not on file    Emotionally Abused: Not on file    Physically Abused: Not on file    Sexually Abused: Not on file   Housing Stability:     Unable to Pay for Housing in the Last Year: Not on file    Number of [place] in the Last Year: Not on file    Unstable Housing in the Last Year: Not on file       SCREENINGS             PHYSICAL EXAM    (up to 7 for level 4, 8 or more for level 5)     ED Triage Vitals [02/08/22 1626]   BP Temp Temp Source Pulse Resp SpO2 Height Weight   135/89 98.5 °F (36.9 °C) Oral 92 17 97 % 5' 3.5\" (1.613 m) 133 lb 13.1 oz (60.7 kg)       Physical Exam  Vitals reviewed. Constitutional:       General: She is not in acute distress. Appearance: She is well-developed and normal weight. She is not ill-appearing or toxic-appearing. HENT:      Head: Normocephalic and atraumatic. Right Ear: Tympanic membrane and ear canal normal. Tympanic membrane is not erythematous. Left Ear: Tympanic membrane and ear canal normal. Tympanic membrane is not erythematous. Nose: No congestion or rhinorrhea. Mouth/Throat:      Mouth: Mucous membranes are moist.      Pharynx: Uvula midline. Posterior oropharyngeal erythema (petechiae) present. No pharyngeal swelling, oropharyngeal exudate or uvula swelling. Tonsils: No tonsillar exudate. Eyes:      Extraocular Movements:      Right eye: Normal extraocular motion. Left eye: Normal extraocular motion. Cardiovascular:      Rate and Rhythm: Normal rate and regular rhythm. Heart sounds: Normal heart sounds. No murmur heard. Pulmonary:      Effort: Pulmonary effort is normal. No respiratory distress. Breath sounds: Normal breath sounds. Abdominal:      General: There is no distension. Palpations: Abdomen is soft. Musculoskeletal:      Cervical back: Normal range of motion and neck supple. Lymphadenopathy:      Cervical: No cervical adenopathy. Neurological:      General: No focal deficit present. Mental Status: She is alert and oriented to person, place, and time. Psychiatric:         Mood and Affect: Mood normal.         Behavior: Behavior normal.         DIAGNOSTIC RESULTS   LABS:    Labs Reviewed   [id number]   COVID-19   COVID-19   COVID-19       When ordered, only abnormal lab results are displayed. All other labs were within normal range or not returned as of this dictation. EKG: When ordered, EKG's are interpreted by the Emergency Department Physician in the absence of a cardiologist.  Please see their note for interpretation of EKG. RADIOLOGY:   Non-plain film images such as CT, Ultrasound and MRI are read by the radiologist. Plain radiographic images are visualized andpreliminarily interpreted by the  ED Provider with the below findings:        Interpretation [facility] Radiologist below, if available at the time of this note:    No orders to display     No results found.       PROCEDURES   Unless otherwise noted below, none     Procedures    CRITICAL CARE TIME   N/A    CONSULTS:  None      EMERGENCY DEPARTMENT COURSE and DIFFERENTIAL DIAGNOSIS/MDM:   Vitals:    Vitals:    02/08/22 1626   BP: 135/89   Pulse: 92   Resp: 17   Temp: 98.5 °F (36.9 °C)   TempSrc: Oral   SpO2: 97%   Weight: 133 lb 13.1 oz (60.7 kg)   Height: 5' 3.5\" (1.613 m)       Patient was given thefollowing medications:  Medications   ketorolac (TORADOL) injection 30 mg (30 mg IntraMUSCular Given 2/8/22 1725)           This is a 59-year-old female who presents to the ED with complaints of body aches, sore throat, consistent with viral illness. We discussed what type of virus this could be, but treatment will be the same. She was given injection of Toradol and PCR Covid swab prior to departure. We discussed treating the symptoms and she is agreeable to this plan. I will send her home with prescription for acetaminophen and Phenergan, as patient states Zofran is not working for her. She was given strict return to ED precautions if symptoms worsen or more concerning symptoms present themselves. She was agreeable to follow-up with her PCP in the next 2 to 3 days pending Covid test.       FINAL IMPRESSION      1. Viral illness          DISPOSITION/PLAN   DISPOSITION Discharge - Pending Orders Complete 02/08/2022 05:16:43 PM      PATIENT REFERREDTO:  Cecil Domingo MD  [address]/ [place]   [phone]    Schedule an appointment as soon as possible for a visit in 2 days  for reevaluation    [facility] Emergency Department  [address] [address]  [phone]  Go to   As needed, If symptoms worsen      DISCHARGE MEDICATIONS:  New Prescriptions    ACETAMINOPHEN (TYLENOL) 500 MG TABLET    Take 2 tablets by mouth every 6 hours as needed for Pain    PROMETHAZINE (PHENERGAN) 25 MG TABLET    Take 1 tablet by mouth every 6 hours as needed for Nausea WARNING:  May cause drowsiness. May impair ability to operate vehicles or machinery. Do not use in combination with alcohol.        DISCONTINUED MEDICATIONS:  Discontinued Medications    No medications on file              (Please note that portions ofthis note were completed with a voice recognition program.  Efforts were made to edit the dictations but occasionally

## 2022-02-08 NOTE — Clinical Note
Gonsalo Rene was seen and treated in our emergency department on 2/8/2022. She may return to work on 02/11/2022. If you have any questions or concerns, please don't hesitate to call.       Meagan Arrieta PA-C

## 2022-02-09 LAB — SARS-COV-2: NOT DETECTED

## 2022-05-07 ENCOUNTER — HOSPITAL ENCOUNTER (OUTPATIENT)
Age: 57
Setting detail: OBSERVATION
Discharge: HOME | End: 2022-05-09
Attending: INTERNAL MEDICINE | Admitting: INTERNAL MEDICINE
Payer: MEDICARE

## 2022-05-07 ENCOUNTER — APPOINTMENT (OUTPATIENT)
Dept: CT IMAGING | Age: 57
End: 2022-05-07
Payer: MEDICARE

## 2022-05-07 DIAGNOSIS — N20.1 URETERIC STONE: Primary | ICD-10-CM

## 2022-05-07 DIAGNOSIS — N39.0 ACUTE UTI: ICD-10-CM

## 2022-05-07 DIAGNOSIS — Z98.890 H/O ILEOSTOMY: ICD-10-CM

## 2022-05-07 DIAGNOSIS — N17.9 AKI (ACUTE KIDNEY INJURY) (HCC): ICD-10-CM

## 2022-05-07 DIAGNOSIS — R11.2 INTRACTABLE VOMITING WITH NAUSEA, UNSPECIFIED VOMITING TYPE: ICD-10-CM

## 2022-05-07 DIAGNOSIS — N23 RENAL COLIC ON LEFT SIDE: ICD-10-CM

## 2022-05-07 PROBLEM — N30.01 ACUTE CYSTITIS WITH HEMATURIA: Status: ACTIVE | Noted: 2022-05-07

## 2022-05-07 LAB
A/G RATIO: 1.6 (ref 1.1–2.2)
ALBUMIN SERPL-MCNC: 4.2 G/DL (ref 3.4–5)
ALP BLD-CCNC: 49 U/L (ref 40–129)
ALT SERPL-CCNC: 17 U/L (ref 10–40)
ANION GAP SERPL CALCULATED.3IONS-SCNC: 11 MMOL/L (ref 3–16)
AST SERPL-CCNC: 22 U/L (ref 15–37)
BACTERIA: ABNORMAL /HPF
BASOPHILS ABSOLUTE: 0 K/UL (ref 0–0.2)
BASOPHILS RELATIVE PERCENT: 0.6 %
BILIRUB SERPL-MCNC: <0.2 MG/DL (ref 0–1)
BILIRUBIN URINE: NEGATIVE
BLOOD, URINE: ABNORMAL
BUN BLDV-MCNC: 11 MG/DL (ref 7–20)
CALCIUM OXALATE CRYSTALS: PRESENT
CALCIUM SERPL-MCNC: 9.4 MG/DL (ref 8.3–10.6)
CHLORIDE BLD-SCNC: 101 MMOL/L (ref 99–110)
CLARITY: CLEAR
CO2: 22 MMOL/L (ref 21–32)
COLOR: YELLOW
CREAT SERPL-MCNC: 1.2 MG/DL (ref 0.6–1.1)
EOSINOPHILS ABSOLUTE: 0 K/UL (ref 0–0.6)
EOSINOPHILS RELATIVE PERCENT: 0.3 %
EPITHELIAL CELLS, UA: 6 /HPF (ref 0–5)
GFR AFRICAN AMERICAN: 56
GFR NON-AFRICAN AMERICAN: 46
GLUCOSE BLD-MCNC: 79 MG/DL (ref 70–99)
GLUCOSE URINE: NEGATIVE MG/DL
HCT VFR BLD CALC: 38 % (ref 36–48)
HEMOGLOBIN: 12.4 G/DL (ref 12–16)
HYALINE CASTS: 6 /LPF (ref 0–8)
KETONES, URINE: NEGATIVE MG/DL
LACTIC ACID: 0.7 MMOL/L (ref 0.4–2)
LEUKOCYTE ESTERASE, URINE: NEGATIVE
LYMPHOCYTES ABSOLUTE: 1.2 K/UL (ref 1–5.1)
LYMPHOCYTES RELATIVE PERCENT: 37.4 %
MCH RBC QN AUTO: 29.6 PG (ref 26–34)
MCHC RBC AUTO-ENTMCNC: 32.7 G/DL (ref 31–36)
MCV RBC AUTO: 90.5 FL (ref 80–100)
MICROSCOPIC EXAMINATION: YES
MONOCYTES ABSOLUTE: 0.3 K/UL (ref 0–1.3)
MONOCYTES RELATIVE PERCENT: 10.7 %
MUCUS: PRESENT
NEUTROPHILS ABSOLUTE: 1.6 K/UL (ref 1.7–7.7)
NEUTROPHILS RELATIVE PERCENT: 51 %
NITRITE, URINE: POSITIVE
PDW BLD-RTO: 14.3 % (ref 12.4–15.4)
PH UA: 6 (ref 5–8)
PLATELET # BLD: 138 K/UL (ref 135–450)
PMV BLD AUTO: 9.6 FL (ref 5–10.5)
POTASSIUM REFLEX MAGNESIUM: 4.6 MMOL/L (ref 3.5–5.1)
PROTEIN UA: ABNORMAL MG/DL
RBC # BLD: 4.2 M/UL (ref 4–5.2)
RBC UA: 4 /HPF (ref 0–4)
SARS-COV-2, NAAT: NOT DETECTED
SODIUM BLD-SCNC: 134 MMOL/L (ref 136–145)
SPECIFIC GRAVITY UA: >=1.03 (ref 1–1.03)
TOTAL PROTEIN: 6.9 G/DL (ref 6.4–8.2)
URINE REFLEX TO CULTURE: ABNORMAL
URINE TYPE: ABNORMAL
UROBILINOGEN, URINE: 0.2 E.U./DL
WBC # BLD: 3.2 K/UL (ref 4–11)
WBC UA: 5 /HPF (ref 0–5)

## 2022-05-07 PROCEDURE — 87086 URINE CULTURE/COLONY COUNT: CPT

## 2022-05-07 PROCEDURE — 83605 ASSAY OF LACTIC ACID: CPT

## 2022-05-07 PROCEDURE — 6360000002 HC RX W HCPCS: Performed by: INTERNAL MEDICINE

## 2022-05-07 PROCEDURE — 87186 SC STD MICRODIL/AGAR DIL: CPT

## 2022-05-07 PROCEDURE — 85025 COMPLETE CBC W/AUTO DIFF WBC: CPT

## 2022-05-07 PROCEDURE — 6370000000 HC RX 637 (ALT 250 FOR IP): Performed by: NURSE PRACTITIONER

## 2022-05-07 PROCEDURE — 96376 TX/PRO/DX INJ SAME DRUG ADON: CPT

## 2022-05-07 PROCEDURE — 2580000003 HC RX 258: Performed by: INTERNAL MEDICINE

## 2022-05-07 PROCEDURE — 87635 SARS-COV-2 COVID-19 AMP PRB: CPT

## 2022-05-07 PROCEDURE — 99285 EMERGENCY DEPT VISIT HI MDM: CPT

## 2022-05-07 PROCEDURE — G0378 HOSPITAL OBSERVATION PER HR: HCPCS

## 2022-05-07 PROCEDURE — 74176 CT ABD & PELVIS W/O CONTRAST: CPT

## 2022-05-07 PROCEDURE — 96365 THER/PROPH/DIAG IV INF INIT: CPT

## 2022-05-07 PROCEDURE — 36415 COLL VENOUS BLD VENIPUNCTURE: CPT

## 2022-05-07 PROCEDURE — 2580000003 HC RX 258: Performed by: NURSE PRACTITIONER

## 2022-05-07 PROCEDURE — 80053 COMPREHEN METABOLIC PANEL: CPT

## 2022-05-07 PROCEDURE — 94760 N-INVAS EAR/PLS OXIMETRY 1: CPT

## 2022-05-07 PROCEDURE — 96375 TX/PRO/DX INJ NEW DRUG ADDON: CPT

## 2022-05-07 PROCEDURE — 81001 URINALYSIS AUTO W/SCOPE: CPT

## 2022-05-07 PROCEDURE — 96361 HYDRATE IV INFUSION ADD-ON: CPT

## 2022-05-07 PROCEDURE — 6360000002 HC RX W HCPCS: Performed by: NURSE PRACTITIONER

## 2022-05-07 PROCEDURE — 6370000000 HC RX 637 (ALT 250 FOR IP): Performed by: INTERNAL MEDICINE

## 2022-05-07 PROCEDURE — 87077 CULTURE AEROBIC IDENTIFY: CPT

## 2022-05-07 RX ORDER — TRAZODONE HYDROCHLORIDE 100 MG/1
200 TABLET ORAL NIGHTLY
Status: DISCONTINUED | OUTPATIENT
Start: 2022-05-07 | End: 2022-05-09 | Stop reason: HOSPADM

## 2022-05-07 RX ORDER — ONDANSETRON 4 MG/1
4 TABLET, ORALLY DISINTEGRATING ORAL EVERY 8 HOURS PRN
Status: DISCONTINUED | OUTPATIENT
Start: 2022-05-07 | End: 2022-05-09 | Stop reason: HOSPADM

## 2022-05-07 RX ORDER — ESTRADIOL 0.04 MG/D
1 FILM, EXTENDED RELEASE TRANSDERMAL
COMMUNITY

## 2022-05-07 RX ORDER — TRAMADOL HYDROCHLORIDE 50 MG/1
100 TABLET ORAL NIGHTLY
Status: DISCONTINUED | OUTPATIENT
Start: 2022-05-07 | End: 2022-05-09 | Stop reason: HOSPADM

## 2022-05-07 RX ORDER — TRAZODONE HYDROCHLORIDE 50 MG/1
50 TABLET ORAL NIGHTLY
Status: DISCONTINUED | OUTPATIENT
Start: 2022-05-07 | End: 2022-05-07

## 2022-05-07 RX ORDER — ONDANSETRON 2 MG/ML
4 INJECTION INTRAMUSCULAR; INTRAVENOUS EVERY 6 HOURS PRN
Status: DISCONTINUED | OUTPATIENT
Start: 2022-05-07 | End: 2022-05-09 | Stop reason: HOSPADM

## 2022-05-07 RX ORDER — GABAPENTIN 100 MG/1
200 CAPSULE ORAL 4 TIMES DAILY
Status: DISCONTINUED | OUTPATIENT
Start: 2022-05-07 | End: 2022-05-07

## 2022-05-07 RX ORDER — CEVIMELINE HYDROCHLORIDE 30 MG/1
30 CAPSULE ORAL 3 TIMES DAILY
Status: DISCONTINUED | OUTPATIENT
Start: 2022-05-07 | End: 2022-05-09 | Stop reason: HOSPADM

## 2022-05-07 RX ORDER — ACETAMINOPHEN 650 MG/1
650 SUPPOSITORY RECTAL EVERY 6 HOURS PRN
Status: DISCONTINUED | OUTPATIENT
Start: 2022-05-07 | End: 2022-05-09 | Stop reason: HOSPADM

## 2022-05-07 RX ORDER — TRAMADOL HYDROCHLORIDE 50 MG/1
100 TABLET ORAL EVERY EVENING
Status: ON HOLD | COMMUNITY
End: 2022-05-09 | Stop reason: SDUPTHER

## 2022-05-07 RX ORDER — LANOLIN ALCOHOL/MO/W.PET/CERES
9 CREAM (GRAM) TOPICAL NIGHTLY PRN
Status: DISCONTINUED | OUTPATIENT
Start: 2022-05-07 | End: 2022-05-09 | Stop reason: HOSPADM

## 2022-05-07 RX ORDER — POLYETHYLENE GLYCOL 3350 17 G/17G
17 POWDER, FOR SOLUTION ORAL DAILY PRN
Status: DISCONTINUED | OUTPATIENT
Start: 2022-05-07 | End: 2022-05-09 | Stop reason: HOSPADM

## 2022-05-07 RX ORDER — OXYCODONE HYDROCHLORIDE 5 MG/1
5 TABLET ORAL EVERY 4 HOURS PRN
Status: DISCONTINUED | OUTPATIENT
Start: 2022-05-07 | End: 2022-05-09 | Stop reason: HOSPADM

## 2022-05-07 RX ORDER — DULOXETIN HYDROCHLORIDE 30 MG/1
30 CAPSULE, DELAYED RELEASE ORAL DAILY
Status: DISCONTINUED | OUTPATIENT
Start: 2022-05-08 | End: 2022-05-07

## 2022-05-07 RX ORDER — KETOROLAC TROMETHAMINE 30 MG/ML
15 INJECTION, SOLUTION INTRAMUSCULAR; INTRAVENOUS ONCE
Status: COMPLETED | OUTPATIENT
Start: 2022-05-07 | End: 2022-05-07

## 2022-05-07 RX ORDER — ACETAMINOPHEN 500 MG
1000 TABLET ORAL 2 TIMES DAILY
COMMUNITY

## 2022-05-07 RX ORDER — PROCHLORPERAZINE EDISYLATE 5 MG/ML
10 INJECTION INTRAMUSCULAR; INTRAVENOUS ONCE
Status: COMPLETED | OUTPATIENT
Start: 2022-05-07 | End: 2022-05-07

## 2022-05-07 RX ORDER — ONDANSETRON 4 MG/1
4 TABLET, ORALLY DISINTEGRATING ORAL PRN
COMMUNITY
End: 2022-05-13 | Stop reason: ALTCHOICE

## 2022-05-07 RX ORDER — SODIUM CHLORIDE 0.9 % (FLUSH) 0.9 %
10 SYRINGE (ML) INJECTION PRN
Status: DISCONTINUED | OUTPATIENT
Start: 2022-05-07 | End: 2022-05-09 | Stop reason: HOSPADM

## 2022-05-07 RX ORDER — ZOLPIDEM TARTRATE 5 MG/1
10 TABLET ORAL NIGHTLY PRN
Status: DISCONTINUED | OUTPATIENT
Start: 2022-05-07 | End: 2022-05-09 | Stop reason: HOSPADM

## 2022-05-07 RX ORDER — ENOXAPARIN SODIUM 100 MG/ML
40 INJECTION SUBCUTANEOUS DAILY
Status: DISCONTINUED | OUTPATIENT
Start: 2022-05-08 | End: 2022-05-09 | Stop reason: HOSPADM

## 2022-05-07 RX ORDER — DONEPEZIL HYDROCHLORIDE 5 MG/1
5 TABLET, ORALLY DISINTEGRATING ORAL NIGHTLY
Status: DISCONTINUED | OUTPATIENT
Start: 2022-05-07 | End: 2022-05-07

## 2022-05-07 RX ORDER — ONDANSETRON 2 MG/ML
4 INJECTION INTRAMUSCULAR; INTRAVENOUS ONCE
Status: COMPLETED | OUTPATIENT
Start: 2022-05-07 | End: 2022-05-07

## 2022-05-07 RX ORDER — HYDRALAZINE HYDROCHLORIDE 20 MG/ML
10 INJECTION INTRAMUSCULAR; INTRAVENOUS EVERY 6 HOURS PRN
Status: DISCONTINUED | OUTPATIENT
Start: 2022-05-07 | End: 2022-05-09 | Stop reason: HOSPADM

## 2022-05-07 RX ORDER — SODIUM CHLORIDE 0.9 % (FLUSH) 0.9 %
5-40 SYRINGE (ML) INJECTION EVERY 12 HOURS SCHEDULED
Status: DISCONTINUED | OUTPATIENT
Start: 2022-05-07 | End: 2022-05-09 | Stop reason: HOSPADM

## 2022-05-07 RX ORDER — DULOXETIN HYDROCHLORIDE 60 MG/1
120 CAPSULE, DELAYED RELEASE ORAL DAILY
Status: DISCONTINUED | OUTPATIENT
Start: 2022-05-08 | End: 2022-05-09 | Stop reason: HOSPADM

## 2022-05-07 RX ORDER — SODIUM CHLORIDE, SODIUM LACTATE, POTASSIUM CHLORIDE, CALCIUM CHLORIDE 600; 310; 30; 20 MG/100ML; MG/100ML; MG/100ML; MG/100ML
INJECTION, SOLUTION INTRAVENOUS CONTINUOUS
Status: ACTIVE | OUTPATIENT
Start: 2022-05-07 | End: 2022-05-08

## 2022-05-07 RX ORDER — LIFITEGRAST 50 MG/ML
1 SOLUTION/ DROPS OPHTHALMIC DAILY
COMMUNITY

## 2022-05-07 RX ORDER — CYCLOSPORINE 0.5 MG/ML
1 EMULSION OPHTHALMIC DAILY
Status: DISCONTINUED | OUTPATIENT
Start: 2022-05-08 | End: 2022-05-07

## 2022-05-07 RX ORDER — 0.9 % SODIUM CHLORIDE 0.9 %
1000 INTRAVENOUS SOLUTION INTRAVENOUS ONCE
Status: COMPLETED | OUTPATIENT
Start: 2022-05-07 | End: 2022-05-07

## 2022-05-07 RX ORDER — ESTRADIOL 0.04 MG/D
1 FILM, EXTENDED RELEASE TRANSDERMAL
Status: DISCONTINUED | OUTPATIENT
Start: 2022-05-08 | End: 2022-05-09 | Stop reason: HOSPADM

## 2022-05-07 RX ORDER — AMITRIPTYLINE HYDROCHLORIDE 25 MG/1
25 TABLET, FILM COATED ORAL NIGHTLY
Status: DISCONTINUED | OUTPATIENT
Start: 2022-05-07 | End: 2022-05-07

## 2022-05-07 RX ORDER — LANOLIN ALCOHOL/MO/W.PET/CERES
3 CREAM (GRAM) TOPICAL DAILY
Status: DISCONTINUED | OUTPATIENT
Start: 2022-05-07 | End: 2022-05-07

## 2022-05-07 RX ORDER — ESTRADIOL 1 MG/1
0.5 TABLET ORAL DAILY
Status: DISCONTINUED | OUTPATIENT
Start: 2022-05-08 | End: 2022-05-07

## 2022-05-07 RX ORDER — MORPHINE SULFATE 4 MG/ML
4 INJECTION, SOLUTION INTRAMUSCULAR; INTRAVENOUS ONCE
Status: DISCONTINUED | OUTPATIENT
Start: 2022-05-07 | End: 2022-05-07

## 2022-05-07 RX ORDER — TAMSULOSIN HYDROCHLORIDE 0.4 MG/1
0.4 CAPSULE ORAL DAILY
Status: DISCONTINUED | OUTPATIENT
Start: 2022-05-07 | End: 2022-05-09 | Stop reason: HOSPADM

## 2022-05-07 RX ORDER — VITAMIN B COMPLEX
1000 TABLET ORAL DAILY
Status: DISCONTINUED | OUTPATIENT
Start: 2022-05-08 | End: 2022-05-09 | Stop reason: HOSPADM

## 2022-05-07 RX ORDER — PANTOPRAZOLE SODIUM 40 MG/1
40 TABLET, DELAYED RELEASE ORAL
Status: DISCONTINUED | OUTPATIENT
Start: 2022-05-08 | End: 2022-05-09 | Stop reason: HOSPADM

## 2022-05-07 RX ORDER — SODIUM CHLORIDE 9 MG/ML
INJECTION, SOLUTION INTRAVENOUS PRN
Status: DISCONTINUED | OUTPATIENT
Start: 2022-05-07 | End: 2022-05-09 | Stop reason: HOSPADM

## 2022-05-07 RX ORDER — ACETAMINOPHEN 325 MG/1
650 TABLET ORAL EVERY 6 HOURS PRN
Status: DISCONTINUED | OUTPATIENT
Start: 2022-05-07 | End: 2022-05-09 | Stop reason: HOSPADM

## 2022-05-07 RX ORDER — HYDROXYCHLOROQUINE SULFATE 200 MG/1
300 TABLET, FILM COATED ORAL DAILY
COMMUNITY

## 2022-05-07 RX ADMIN — TRAMADOL HYDROCHLORIDE 100 MG: 50 TABLET, COATED ORAL at 20:18

## 2022-05-07 RX ADMIN — ONDANSETRON 4 MG: 2 INJECTION INTRAMUSCULAR; INTRAVENOUS at 14:53

## 2022-05-07 RX ADMIN — TRAZODONE HYDROCHLORIDE 200 MG: 100 TABLET ORAL at 20:19

## 2022-05-07 RX ADMIN — SODIUM CHLORIDE, POTASSIUM CHLORIDE, SODIUM LACTATE AND CALCIUM CHLORIDE: 600; 310; 30; 20 INJECTION, SOLUTION INTRAVENOUS at 18:50

## 2022-05-07 RX ADMIN — ZOLPIDEM TARTRATE 10 MG: 5 TABLET ORAL at 21:40

## 2022-05-07 RX ADMIN — Medication 9 MG: at 20:18

## 2022-05-07 RX ADMIN — SODIUM CHLORIDE 1000 ML: 9 INJECTION, SOLUTION INTRAVENOUS at 16:15

## 2022-05-07 RX ADMIN — SODIUM CHLORIDE 1000 ML: 9 INJECTION, SOLUTION INTRAVENOUS at 14:50

## 2022-05-07 RX ADMIN — PANCRELIPASE 108000 UNITS: 24000; 76000; 120000 CAPSULE, DELAYED RELEASE PELLETS ORAL at 21:42

## 2022-05-07 RX ADMIN — PROCHLORPERAZINE EDISYLATE 10 MG: 5 INJECTION INTRAMUSCULAR; INTRAVENOUS at 15:53

## 2022-05-07 RX ADMIN — KETOROLAC TROMETHAMINE 15 MG: 30 INJECTION, SOLUTION INTRAMUSCULAR at 14:53

## 2022-05-07 RX ADMIN — CEFTRIAXONE 1000 MG: 1 INJECTION, POWDER, FOR SOLUTION INTRAMUSCULAR; INTRAVENOUS at 15:57

## 2022-05-07 RX ADMIN — HYDROMORPHONE HYDROCHLORIDE 1 MG: 1 INJECTION, SOLUTION INTRAMUSCULAR; INTRAVENOUS; SUBCUTANEOUS at 21:43

## 2022-05-07 RX ADMIN — HYDROMORPHONE HYDROCHLORIDE 0.5 MG: 1 INJECTION, SOLUTION INTRAMUSCULAR; INTRAVENOUS; SUBCUTANEOUS at 15:54

## 2022-05-07 ASSESSMENT — PAIN - FUNCTIONAL ASSESSMENT
PAIN_FUNCTIONAL_ASSESSMENT: ACTIVITIES ARE NOT PREVENTED
PAIN_FUNCTIONAL_ASSESSMENT: ACTIVITIES ARE NOT PREVENTED

## 2022-05-07 ASSESSMENT — PAIN SCALES - GENERAL
PAINLEVEL_OUTOF10: 8
PAINLEVEL_OUTOF10: 0
PAINLEVEL_OUTOF10: 3
PAINLEVEL_OUTOF10: 0
PAINLEVEL_OUTOF10: 7
PAINLEVEL_OUTOF10: 8
PAINLEVEL_OUTOF10: 8

## 2022-05-07 ASSESSMENT — PAIN DESCRIPTION - LOCATION
LOCATION: ABDOMEN
LOCATION: ABDOMEN;BACK
LOCATION: BACK;ABDOMEN

## 2022-05-07 ASSESSMENT — PAIN SCALES - WONG BAKER
WONGBAKER_NUMERICALRESPONSE: 0

## 2022-05-07 ASSESSMENT — PAIN DESCRIPTION - DESCRIPTORS
DESCRIPTORS: ACHING;DULL;DISCOMFORT;GNAWING
DESCRIPTORS: ACHING;DULL;DISCOMFORT;GNAWING
DESCRIPTORS: ACHING

## 2022-05-07 ASSESSMENT — LIFESTYLE VARIABLES: HOW OFTEN DO YOU HAVE A DRINK CONTAINING ALCOHOL: NEVER

## 2022-05-07 ASSESSMENT — PAIN DESCRIPTION - ORIENTATION
ORIENTATION: LOWER
ORIENTATION: LOWER

## 2022-05-07 NOTE — H&P
Hospital Medicine History and Physical    5/7/2022    Date of Admission: 5/7/2022    Date of Service: Pt seen/examined on 5/7/2022 and admitted to observation. Assessment/plan:  1. Acute cystitis. Patient has been started on Rocephin in the emergency room. Will continue Rocephin pending urine culture results. 2. Nephrolithiasis, possible ureterolithiasis. Start Flomax. Strain all urine. Continue intravenous fluid. Urology has been consulted from the emergency room; await their evaluation and recommendations. N.p.o. after midnight. 3. Patient does not have acute kidney injury. Baseline creatinine is about 1.1 (see Care Everywhere). Current creatinine of 1.2 does not qualify as ZAINAB. 4. Other comorbidities: history of osteoarthritis, CKD stage III, essential hypertension, migraine headaches, history of kidney stones. Activities: Up with assist  Prophylaxis: Subcutaneous Lovenox  Code status: Full code    ==========================================================  Chief complaint:  Chief Complaint   Patient presents with    Back Pain     dark urine, lower back pain with lower abdominal pain with nausea        History of Presenting Illness: This is a pleasant 65 y.o. female with history of osteoarthritis, essential hypertension, CKD stage III, migraine headaches, history of kidney stones, who presents to the emergency room with complaints of lower/suprapubic abdominal pain, left lower back pain, dysuria, dark-colored urine, ongoing for the past 4 days. There is no fever or chills. In the emergency room, urinalysis is concerning for possible infection. CT-abdomen/pelvis with indeterminate 3 mm calcification in the left upper pelvis possibly a distal ureteral calculus but no significant hydronephrosis. Urologist was consulted from the emergency room, recommends admission for further evaluation. Patient was given a dose of Rocephin for possible infected kidney stone.     Past Medical History:      Diagnosis Date    Arthritis     Frequency of urination     GERD (gastroesophageal reflux disease)     Hypertension     has not taken meds for 2 weeks    Migraines, neuralgic     Pancreatitis        Past Surgical History:      Procedure Laterality Date    BUNIONECTOMY       SECTION      CHOLECYSTECTOMY      COLECTOMY      ERCP      STENT, MULTIPLE    ERCP  2011     STENT REMOVAL, 5 FR 5 SM STENT PLACEMENT    ERCP      HYSTERECTOMY      JOINT REPLACEMENT      left knee    TUBAL LIGATION      UPPER GASTROINTESTINAL ENDOSCOPY  13    WITH BIOPSY AND BOTOX INJECTION    UPPER GASTROINTESTINAL ENDOSCOPY  3/25/14    with botox injection    UPPER GASTROINTESTINAL ENDOSCOPY  10-24-14    Esophagogastroduodenoscopy with Botulinum toxin injection of the pylorus    UPPER GASTROINTESTINAL ENDOSCOPY  3/24/15    WITH BOTOX INJECTION       Medications (prior to admission):  Prior to Admission medications    Medication Sig Start Date End Date Taking? Authorizing Provider   acetaminophen (TYLENOL) 500 MG tablet Take 2 tablets by mouth every 6 hours as needed for Pain 22  Linde Rubinstein, PA-C   amitriptyline (ELAVIL) 25 MG tablet Take 25 mg by mouth nightly    Historical Provider, MD   cevimeline (EVOXAC) 30 MG capsule Take 30 mg by mouth 3 times daily 21   Historical Provider, MD   cycloSPORINE (RESTASIS) 0.05 % ophthalmic emulsion Apply 1 drop to eye daily 18   Historical Provider, MD   donepezil (ARICEPT ODT) 5 MG disintegrating tablet Take 5 mg by mouth nightly 21   Historical Provider, MD   DULoxetine (CYMBALTA) 30 MG extended release capsule Take 30 mg by mouth daily 18   Historical Provider, MD   gabapentin (NEURONTIN) 100 MG capsule Take 200 mg by mouth 4 times daily. Historical Provider, MD   HYDROcodone-acetaminophen (NORCO) 5-325 MG per tablet Take 1 tablet by mouth every 4 hours.  21   Historical Provider, MD   midodrine ([address]) 5 MG tablet Take 1 tablet by mouth daily 6/11/21   Historical Provider, MD   pantoprazole (PROTONIX) 40 MG tablet Take 1 tablet by mouth daily 6/11/21   Historical Provider, MD   potassium chloride (KLOR-CON) 10 MEQ extended release tablet Take 1 tablet by mouth daily 4/2/21   Historical Provider, MD   zolpidem (AMBIEN) 10 MG tablet Take 10 mg by mouth nightly. Historical Provider, MD   cyclobenzaprine (FLEXERIL) 10 MG tablet Take 1 tablet by mouth 3 times daily as needed for Muscle spasms 1/29/17   Precious Swan MD   naproxen (NAPROSYN) 500 MG tablet Take 1 tablet by mouth 2 times daily (with meals) for 7 days 1/29/17 2/5/17  Precious Swan MD   traZODone (DESYREL) 50 MG tablet Take 50 mg by mouth nightly. Historical Provider, MD   melatonin 3 MG TABS tablet Take 3 mg by mouth daily. Historical Provider, MD   estradiol (ESTRACE) 0.5 MG tablet Take 0.5 mg by mouth daily. Historical Provider, MD   acyclovir (ZOVIRAX) 200 MG capsule Take 400 mg by mouth 2 times daily. Historical Provider, MD   amylase-lipase-protease (LIPRAM-CR10) 33.2-10-37.5 MU per capsule Take 3 capsules by mouth 3 times daily (with meals). Historical Provider, MD       Allergy(ies): Morphine and Vicodin [hydrocodone-acetaminophen]    Social History:  TOBACCO:  reports that she has never smoked. She has never used smokeless tobacco.  ETOH:  reports no history of alcohol use. Family History:      Problem Relation Age of Onset    Diabetes Mother     Cancer Mother     Arthritis Mother     High Blood Pressure Mother     High Blood Pressure Sister     Cancer Maternal Aunt     Diabetes Maternal Grandmother     Anesth Problems Neg Hx     Malig Hyperten Neg Hx     Hypotension Neg Hx     Malig Hypertherm Neg Hx     Pseudochol. Deficiency Neg Hx        Review of Systems:  Pertinent positives are listed in HPI. At least 10-point ROS reviewed and were negative.      Vitals and physical examination:  BP (!) 160/88   Pulse 70 Temp 98.2 °F (36.8 °C)   Resp 18   Ht 5' 3\" (1.6 m)   Wt 132 lb 4.4 oz (60 kg)   SpO2 100%   BMI 23.43 kg/m²   Gen/overall appearance: Not in acute distress. Alert. Oriented X3  Head: Normocephalic, atraumatic  Eyes: EOMI, good acuity  ENT: Oral mucosa moist  Neck: No JVD, thyromegaly  CVS: Nml S1S2, no MRG, RRR  Pulm: Clear bilaterally. No crackles/wheezes  Gastrointestinal: Soft, NT/ND, +BS  Musculoskeletal: Left CVA tenderness on exam. No edema. Warm  Neuro: No focal deficit. Moves extremity spontaneously. Psychiatry: Appropriate affect. Not agitated. Skin: Warm, dry with normal turgor. No rash  Capillary refill: Brisk,< 3 seconds   Peripheral Pulses: +2 palpable, equal bilaterally       Labs/imaging/EKG:  CBC:   Recent Labs     05/07/22  1437   WBC 3.2*   HGB 12.4        BMP:    Recent Labs     05/07/22  1437   *   K 4.6      CO2 22   BUN 11   CREATININE 1.2*   GLUCOSE 79     Hepatic:   Recent Labs     05/07/22  1437   AST 22   ALT 17   BILITOT <0.2   ALKPHOS 49       CT ABDOMEN PELVIS WO CONTRAST Additional Contrast? None    Result Date: 5/7/2022  EXAMINATION: CT OF THE ABDOMEN AND PELVIS WITHOUT CONTRAST 5/7/2022 1:04 pm TECHNIQUE: CT of the abdomen and pelvis was performed without the administration of intravenous contrast. Multiplanar reformatted images are provided for review. Dose modulation, iterative reconstruction, and/or weight based adjustment of the mA/kV was utilized to reduce the radiation dose to as low as reasonably achievable. COMPARISON: 04/05/2014.  HISTORY: ORDERING SYSTEM PROVIDED HISTORY: dark urine, nausea, left flank pain, has ostomy TECHNOLOGIST PROVIDED HISTORY: Reason for exam:->dark urine, nausea, left flank pain, has ostomy Additional Contrast?->None Decision Support Exception - unselect if not a suspected or confirmed emergency medical condition->Emergency Medical Condition (MA) Reason for Exam: dark urine, nausea, left flank pain, has ostomy, hx colectomy, manjit, ERCP with stent, hysterectomy FINDINGS: Lower Chest: No acute infiltrate at the lung bases. Organs: Status post cholecystectomy. Pneumobilia is noted presumably related to previous sphincterotomy or other biliary intervention. The unenhanced liver, spleen, pancreas and adrenal glands are unremarkable. No significant hydronephrosis. There are calcifications adjacent to the proximal left ureter at the L2 and L3 vertebral level, likely moisés.  There is a calcification in the upper pelvis measuring 3 mm in size which is indeterminate, possibly within the distal third of the ureter. Punctate nonobstructive bilateral nephrolithiasis. 1.4 cm right renal cyst with no imaging follow-up indicated. GI/Bowel: Previous colectomy with distal Lalo's pouch. Right lower quadrant ileostomy is noted. No acute bowel pathology. No evidence of obstruction. The stomach and duodenal sweep are unremarkable. Pelvis: Status post hysterectomy. Two cystic lesions are present in the right adnexa measuring 2.7 and 2.1 cm respectively. Trace free pelvic fluid. The bladder is contracted. Peritoneum/Retroperitoneum: The abdominal aorta is normal in caliber. No pathologic retroperitoneal adenopathy or upper abdominal ascites. Bones/Soft Tissues: No acute osseous or soft tissue abnormality. 1. Indeterminate 3 mm calcification in the left upper pelvis, possibly a distal ureteral calculus. No significant hydronephrosis. Additional nonobstructive nephrolithiasis. 2. Status post cholecystectomy. Pneumobilia presumably related to previous sphincterotomy. 3. Status post colectomy with right lower quadrant ileostomy. No acute bowel pathology. 4. Two cystic lesions in the right adnexa with no imaging follow-up indicated. Previous hysterectomy. Discussed with ER NP.       Thank you Jony Coon MD for the opportunity to be involved in this patient's care.    -----------------------------  Eliza RABAGO Jennifer Alberts MD  Rounding hospitalist

## 2022-05-07 NOTE — PROGRESS NOTES
Patient admitted to room from ER. Patient able to transfer independently into bed. Patient alert and oriented x4. Oriented to room. Patient with no skin issues. Patient has a RLQ Colostomy. Patient manages colostomy independently. Call light in reach. Will continue to monitor.

## 2022-05-07 NOTE — ED PROVIDER NOTES
[facility]        Pt Name: Dlefino Krishnamurthy  MRN: [id number]  [place] 1965  Date of evaluation: 5/7/2022  Provider: MONALISA Ram - CRISTELA  PCP: Emeka Peterson MD  Note Started: 1:02 PM EDT       DIPIKA. I have evaluated this patient. My supervising physician was available for consultation. CHIEF COMPLAINT       Chief Complaint   Patient presents with    Back Pain     dark urine, lower back pain with lower abdominal pain with nausea        HISTORY OF PRESENT ILLNESS   (Location, Timing/Onset, Context/Setting, Quality, Duration, Modifying Factors, Severity, Associated Signs and Symptoms)  Note limiting factors. Chief Complaint: Left flank pain, dark-colored urine, nausea    Delfino Krishnamurthy is a 65 y.o. female who presents with complaints of lower abdominal pain radiating to the lower back, left flank with dark-colored urine and nausea for the past few days. Patient thought she possibly had a bladder infection or kidney stone as she last had a kidney stone about 1.5 years ago. She denies taking medication for the symptoms. She does have an ostomy in place and thought the output seemed thicker than normal.  She does complain of some pressure in her bladder when she urinates. She denies any alleviating factors. Denies any associated rashes, fevers, headache, neck pain, vomiting, chest pain, cough, wheezing, lightheaded, dizzy, or syncope. Nursing Notes were all reviewed and agreed with or any disagreements were addressed in the HPI. REVIEW OF SYSTEMS    (2-9 systems for level 4, 10 or more for level 5)     Review of Systems    Positives and Pertinent negatives as per HPI. Except as noted above in the ROS, all other systems were reviewed and negative.        PAST MEDICAL HISTORY     Past Medical History:   Diagnosis Date    Arthritis     Frequency of urination     GERD (gastroesophageal reflux disease)     Hypertension     has not taken meds for 2 weeks    Migraines, neuralgic     Pancreatitis          SURGICAL HISTORY     Past Surgical History:   Procedure Laterality Date    BUNIONECTOMY       SECTION      CHOLECYSTECTOMY      COLECTOMY      ERCP      STENT, MULTIPLE    ERCP  2011     STENT REMOVAL, 5 FR 5 SM STENT PLACEMENT    ERCP      HYSTERECTOMY      JOINT REPLACEMENT      left knee    TUBAL LIGATION      UPPER GASTROINTESTINAL ENDOSCOPY  13    WITH BIOPSY AND BOTOX INJECTION    UPPER GASTROINTESTINAL ENDOSCOPY  3/25/14    with botox injection    UPPER GASTROINTESTINAL ENDOSCOPY  10-24-14    Esophagogastroduodenoscopy with Botulinum toxin injection of the pylorus    UPPER GASTROINTESTINAL ENDOSCOPY  3/24/15    WITH BOTOX INJECTION         CURRENTMEDICATIONS       Previous Medications    ACETAMINOPHEN (TYLENOL) 500 MG TABLET    Take 1,000 mg by mouth in the morning and at bedtime    ACYCLOVIR (ZOVIRAX) 400 MG TABLET    Take 400 mg by mouth daily     CEVIMELINE (EVOXAC) 30 MG CAPSULE    Take 30 mg by mouth 3 times daily    DULOXETINE (CYMBALTA) 60 MG EXTENDED RELEASE CAPSULE    Take 120 mg by mouth daily     ESTRADIOL (VIVELLE) 0.0375 MG/24HR    Place 1 patch onto the skin Twice a Week  and Sundays    HYDROXYCHLOROQUINE (PLAQUENIL) 200 MG TABLET    Take 300 mg by mouth daily    LIFITEGRAST (XIIDRA) 5 % SOLN    Place 1 drop into both eyes daily    LIPASE-PROTEASE-AMYLASE (CREON) 64758-899528 UNITS CPEP DELAYED RELEASE CAPSULE    Take 3 capsules by mouth 4 times daily    MELATONIN 10 MG TABS    Take 10 mg by mouth nightly     ONDANSETRON (ZOFRAN-ODT) 4 MG DISINTEGRATING TABLET    Take 4 mg by mouth as needed for Nausea or Vomiting    PANTOPRAZOLE (PROTONIX) 40 MG TABLET    Take 1 tablet by mouth daily    TRAMADOL (ULTRAM) 50 MG TABLET    Take 100 mg by mouth every evening.     TRAZODONE (DESYREL) 100 MG TABLET    Take 200 mg by mouth nightly     VITAMIN D (CHOLECALCIFEROL) 25 MCG (1000 UT) TABS TABLET    Take 1,000 Units by mouth daily    ZOLPIDEM (AMBIEN) 10 MG TABLET    Take 10 mg by mouth nightly. ALLERGIES     Morphine and Vicodin [hydrocodone-acetaminophen]    FAMILYHISTORY       Family History   Problem Relation Age of Onset    Diabetes Mother     Cancer Mother     Arthritis Mother     High Blood Pressure Mother     High Blood Pressure Sister     Cancer Maternal Aunt     Diabetes Maternal Grandmother     Anesth Problems Neg Hx     Malig Hyperten Neg Hx     Hypotension Neg Hx     Malig Hypertherm Neg Hx     Pseudochol. Deficiency Neg Hx           SOCIAL HISTORY       Social History     Tobacco Use    Smoking status: Never Smoker    Smokeless tobacco: Never Used   Substance Use Topics    Alcohol use: No    Drug use: No       SCREENINGS    [place] Coma Scale  Eye Opening: Spontaneous  Best Verbal Response: Oriented  Best Motor Response: Obeys commands  K[place]iller Coma Scale Score: 15        PHYSICAL EXAM    (up to 7 for level 4, 8 or more for level 5)     ED Triage Vitals [05/07/22 1243]   BP Temp Temp src Pulse Resp SpO2 Height Weight   95/68 98.2 °F (36.8 °C) -- 87 15 98 % 5' 3\" (1.6 m) 132 lb 4.4 oz (60 kg)       Physical Exam  Vitals and nursing note reviewed. Constitutional:       General: She is awake. Appearance: Normal appearance. She is well-developed and normal weight. HENT:      Head: Normocephalic and atraumatic. Nose: Nose normal.   Eyes:      General:         Right eye: No discharge. Left eye: No discharge. Cardiovascular:      Rate and Rhythm: Normal rate and regular rhythm. Heart sounds: Normal heart sounds. Pulmonary:      Effort: Pulmonary effort is normal. No respiratory distress. Breath sounds: Normal breath sounds. Abdominal:      General: Bowel sounds are normal.      Palpations: Abdomen is soft. Tenderness: There is abdominal tenderness in the suprapubic area.  There is left CVA tenderness. Musculoskeletal:         General: Normal range of motion. Cervical back: Normal range of motion. Right lower leg: No edema. Left lower leg: No edema. Skin:     General: Skin is warm and dry. Coloration: Skin is not pale. Neurological:      Mental Status: She is alert and oriented to person, place, and time. Motor: Motor function is intact. Gait: Gait is intact. Comments: Ambulated to restroom without difficulty   Psychiatric:         Behavior: Behavior normal. Behavior is cooperative. DIAGNOSTIC RESULTS   LABS:    Labs Reviewed   CBC WITH AUTO DIFFERENTIAL - Abnormal; Notable for the following components:       Result Value    WBC 3.2 (*)     Neutrophils Absolute 1.6 (*)     All other components within normal limits   COMPREHENSIVE METABOLIC PANEL W/ REFLEX TO MG FOR LOW K - Abnormal; Notable for the following components:    Sodium 134 (*)     CREATININE 1.2 (*)     GFR Non- 46 (*)     GFR  56 (*)     All other components within normal limits   URINALYSIS WITH REFLEX TO CULTURE - Abnormal; Notable for the following components:    Blood, Urine TRACE-LYSED (*)     Protein, UA TRACE (*)     Nitrite, Urine POSITIVE (*)     All other components within normal limits   MICROSCOPIC URINALYSIS - Abnormal; Notable for the following components:    Bacteria, UA 4+ (*)     Epithelial Cells, UA 6 (*)     CALCIUM OXALATE CRYSTALS Present (*)     Mucus, UA Present (*)     All other components within normal limits   CULTURE, URINE   LACTIC ACID       When ordered only abnormal lab results are displayed. All other labs were within normal range or not returned as of this dictation. EKG: When ordered, EKG's are interpreted by the Emergency Department Physician in the absence of a cardiologist.  Please see their note for interpretation of EKG.     RADIOLOGY:   Non-plain film images such as CT, Ultrasound and MRI are read by the radiologist. Plain radiographic images are visualized and preliminarily interpreted by the ED Provider with the below findings:        Interpretation per the Radiologist below, if available at the time of this note:    CT ABDOMEN PELVIS WO CONTRAST Additional Contrast? None   Preliminary Result   1. Indeterminate 3 mm calcification in the left upper pelvis, possibly a   distal ureteral calculus. No significant hydronephrosis. Additional   nonobstructive nephrolithiasis. 2. Status post cholecystectomy. Pneumobilia presumably related to previous   sphincterotomy. 3. Status post colectomy with right lower quadrant ileostomy. No acute bowel   pathology. 4. Two cystic lesions in the right adnexa with no imaging follow-up   indicated. Previous hysterectomy. No results found.         PROCEDURES   Unless otherwise noted below, none     Procedures    CRITICAL CARE TIME       CONSULTS:  IP CONSULT TO UROLOGY  IP CONSULT TO HOSPITALIST      EMERGENCY DEPARTMENT COURSE and DIFFERENTIAL DIAGNOSIS/MDM:   Vitals:    Vitals:    05/07/22 1545 05/07/22 1600 05/07/22 1615 05/07/22 1617   BP: (!) 115/101 (!) 159/90 (!) 160/88    Pulse: 76 70 70    Resp: 18 16 16 18   Temp:       SpO2: 98% 100% 98% 100%   Weight:       Height:           Patient was given the following medications:  Medications   0.9 % sodium chloride bolus (1,000 mLs IntraVENous New Bag 5/7/22 1615)   tamsulosin (FLOMAX) capsule 0.4 mg (has no administration in time range)   lactated ringers infusion (has no administration in time range)   oxyCODONE (ROXICODONE) immediate release tablet 5 mg (has no administration in time range)   HYDROmorphone (DILAUDID) injection 0.5 mg (has no administration in time range)     Or   HYDROmorphone (DILAUDID) injection 1 mg (has no administration in time range)   hydrALAZINE (APRESOLINE) injection 10 mg (has no administration in time range)   cefTRIAXone (ROCEPHIN) 1000 mg IVPB in 50 mL D5W minibag (has no administration in time range)   0.9 % sodium chloride bolus (0 mLs IntraVENous Stopped 5/7/22 1614)   ketorolac (TORADOL) injection 15 mg (15 mg IntraVENous Given 5/7/22 1453)   ondansetron (ZOFRAN) injection 4 mg (4 mg IntraVENous Given 5/7/22 1453)   cefTRIAXone (ROCEPHIN) 1000 mg IVPB in 50 mL D5W minibag (0 mg IntraVENous Stopped 5/7/22 1705)   HYDROmorphone (DILAUDID) injection 0.5 mg (0.5 mg IntraVENous Given 5/7/22 1554)   prochlorperazine (COMPAZINE) injection 10 mg (10 mg IntraVENous Given 5/7/22 1553)           Care of this patient took place during the COVID-19 pandemic emergency. ED COURSE & MEDICAL DECISION MAKING    - The patient presented to the ER with complaints of  lower abdominal pain, left flank pain, nausea, dark-colored urine. Vital signs were reviewed. Exam well-developed, well-nourished female who appears uncomfortable. Peripheral IV placed. Labs, Imaging ordered. - Pertinent Labs & Imaging studies reviewed. (See chart for details)   -  Patient seen and evaluated in the emergency department. -  Triage and nursing notes reviewed and incorporated. -  Old chart records reviewed and incorporated. -  DIPIKA. I have evaluated this patient. My supervising physician was available for consultation.  -  Differential diagnosis includes: pelvic inflammatory disease, TOA, ovarian torsion, kidney stone, pyelonephritis, UTI, BV/vaginitis, cervicitis, ovarian cysts, round ligament pain, pregnancy (including ectopic), appendicitis, bowel obstruction, diverticulitis, hernia, gastroenteritis, colitis vs COVID-19  -  Work-up included:  See above  -  ED treatment included:   Normal saline, Zofran, Toradol, Compazine, Dilaudid, rocephin  -  Results discussed with patient. Andrea Mcpherson is a 52-year-old female with complaints of lower abdominal pain into the left flank with nausea, dark-colored urine, and pressure in her bladder when urinating for the past few days.   She has a history of kidney stones approximately 1.5 years ago and thought this seemed similar. She denies any fevers. She does have an ostomy which seem to have thicker output. On exam lungs clear to auscultate bilateral.  Cardiac exam with regular rate and rhythm. Abdomen soft tender to suprapubic region. Ostomy noted to right lateral abdomen. Left CVA tenderness noted. She ambulated to the restroom without difficulty. Lab work and imaging is obtained. UA with trace lysed blood, trace protein, nitrate positive, 4+ bacteria, 6 epithelial cells, mucus present. CBC with WBC 3.2, absolute neutrophils 1.6. CMP with sodium 134, creatinine 1.2. Lactic acid 0.7. CT abdomen pelvis without contrast shows an indeterminate 3 mm calcification in the left upper pole, possibly a distal ureteral calculus. No significant hydronephrosis. Additional nonobstructive nephrolithiasis. Status postcholecystectomy. Pneumobilia presumably related to previous sphincterotomy. Status post colectomy with right lower quadrant ileostomy. No acute bowel pathology. 2 cystic lesions in the right adnexa with no imaging follow-up indicated. Previous hysterectomy. Patient was informed of these results. Says she is continuing to vomit and has pain and additional medication is ordered. Says she does not get relief from Zofran or Phenergan and said Compazine has worked previously. Says she is allergic to morphine as it gives her a headache. Hospitalist Dr. Madelaine Miranda was consulted admission orders are placed. Consult placed to urology, Dr. Solis Garcia. He request the patient to be n.p.o. after midnight and will be set up for a stent tomorrow. CRITICAL CARE TIME   Total Critical Care time was 25 minutes, excluding separately reportable procedures. There was a high probability of clinically significant/life threatening deterioration in the patient's condition which required my urgent intervention. FINAL IMPRESSION      1. Ureteric stone    2. H/O ileostomy    3.  ZAINAB (acute kidney injury) ([facility] [address]    4. Acute UTI    5. Intractable vomiting with nausea, unspecified vomiting type    6.  Renal colic on left side          DISPOSITION/PLAN   DISPOSITION    Admission         (Please note that portions of this note were completed with a voice recognition program.  Efforts were made to edit the dictations but occasionally words are mis-transcribed.)    MONALISA Henriquez CNP (electronically signed)            MONALISA Henriquez CNP  05/07/22 6910 MONALISA Grace CNP  05/08/22 9960

## 2022-05-07 NOTE — PROGRESS NOTES
Pharmacy Medication Reconciliation Note     List of medications Amna Leiva is currently taking is complete. Source of information:   1. Conversation with patient at bedside  2. EMR  3. Patient list    Notes regarding home medications:   1.  Patient was able to verify all meds from her list and report that almost all were taken PTA in the ED    Patient denies taking any OTC or herbal medications    Mercedes Magana, Pharmacy Intern  5/7/2022  4:35 PM

## 2022-05-07 NOTE — PLAN OF CARE
Problem: Discharge Planning  Goal: Discharge to home or other facility with appropriate resources  5/7/2022 1841 by Yahir Dooley RN  Outcome: Progressing     Problem: Pain  Goal: Verbalizes/displays adequate comfort level or baseline comfort level  5/7/2022 1841 by Yahir Dooley RN  Outcome: Progressing

## 2022-05-08 ENCOUNTER — ANESTHESIA EVENT (OUTPATIENT)
Dept: OPERATING ROOM | Age: 57
End: 2022-05-08
Payer: MEDICARE

## 2022-05-08 ENCOUNTER — ANESTHESIA (OUTPATIENT)
Dept: OPERATING ROOM | Age: 57
End: 2022-05-08
Payer: MEDICARE

## 2022-05-08 VITALS — SYSTOLIC BLOOD PRESSURE: 121 MMHG | OXYGEN SATURATION: 100 % | DIASTOLIC BLOOD PRESSURE: 72 MMHG

## 2022-05-08 LAB
A/G RATIO: 1.5 (ref 1.1–2.2)
ALBUMIN SERPL-MCNC: 3.1 G/DL (ref 3.4–5)
ALP BLD-CCNC: 36 U/L (ref 40–129)
ALT SERPL-CCNC: 14 U/L (ref 10–40)
ANION GAP SERPL CALCULATED.3IONS-SCNC: 11 MMOL/L (ref 3–16)
AST SERPL-CCNC: 15 U/L (ref 15–37)
BASOPHILS ABSOLUTE: 0 K/UL (ref 0–0.2)
BASOPHILS RELATIVE PERCENT: 0.7 %
BILIRUB SERPL-MCNC: <0.2 MG/DL (ref 0–1)
BUN BLDV-MCNC: 9 MG/DL (ref 7–20)
CALCIUM SERPL-MCNC: 8.7 MG/DL (ref 8.3–10.6)
CHLORIDE BLD-SCNC: 105 MMOL/L (ref 99–110)
CO2: 23 MMOL/L (ref 21–32)
CREAT SERPL-MCNC: 1 MG/DL (ref 0.6–1.1)
EOSINOPHILS ABSOLUTE: 0 K/UL (ref 0–0.6)
EOSINOPHILS RELATIVE PERCENT: 1 %
GFR AFRICAN AMERICAN: >60
GFR NON-AFRICAN AMERICAN: 57
GLUCOSE BLD-MCNC: 78 MG/DL (ref 70–99)
HCT VFR BLD CALC: 31.8 % (ref 36–48)
HEMOGLOBIN: 10.7 G/DL (ref 12–16)
LYMPHOCYTES ABSOLUTE: 1.4 K/UL (ref 1–5.1)
LYMPHOCYTES RELATIVE PERCENT: 49.1 %
MAGNESIUM: 1.6 MG/DL (ref 1.8–2.4)
MCH RBC QN AUTO: 30.1 PG (ref 26–34)
MCHC RBC AUTO-ENTMCNC: 33.6 G/DL (ref 31–36)
MCV RBC AUTO: 89.7 FL (ref 80–100)
MONOCYTES ABSOLUTE: 0.3 K/UL (ref 0–1.3)
MONOCYTES RELATIVE PERCENT: 10.7 %
NEUTROPHILS ABSOLUTE: 1.1 K/UL (ref 1.7–7.7)
NEUTROPHILS RELATIVE PERCENT: 38.5 %
PDW BLD-RTO: 13.8 % (ref 12.4–15.4)
PHOSPHORUS: 3.8 MG/DL (ref 2.5–4.9)
PLATELET # BLD: 112 K/UL (ref 135–450)
PMV BLD AUTO: 8.8 FL (ref 5–10.5)
POTASSIUM SERPL-SCNC: 4.1 MMOL/L (ref 3.5–5.1)
RBC # BLD: 3.55 M/UL (ref 4–5.2)
SODIUM BLD-SCNC: 139 MMOL/L (ref 136–145)
TOTAL PROTEIN: 5.2 G/DL (ref 6.4–8.2)
WBC # BLD: 2.8 K/UL (ref 4–11)

## 2022-05-08 PROCEDURE — 3600000012 HC SURGERY LEVEL 2 ADDTL 15MIN: Performed by: SURGERY

## 2022-05-08 PROCEDURE — 6370000000 HC RX 637 (ALT 250 FOR IP): Performed by: SURGERY

## 2022-05-08 PROCEDURE — 7100000000 HC PACU RECOVERY - FIRST 15 MIN: Performed by: SURGERY

## 2022-05-08 PROCEDURE — 94760 N-INVAS EAR/PLS OXIMETRY 1: CPT

## 2022-05-08 PROCEDURE — 2580000003 HC RX 258: Performed by: INTERNAL MEDICINE

## 2022-05-08 PROCEDURE — 3600000002 HC SURGERY LEVEL 2 BASE: Performed by: SURGERY

## 2022-05-08 PROCEDURE — 6360000002 HC RX W HCPCS: Performed by: ANESTHESIOLOGY

## 2022-05-08 PROCEDURE — G0378 HOSPITAL OBSERVATION PER HR: HCPCS

## 2022-05-08 PROCEDURE — 36415 COLL VENOUS BLD VENIPUNCTURE: CPT

## 2022-05-08 PROCEDURE — 80053 COMPREHEN METABOLIC PANEL: CPT

## 2022-05-08 PROCEDURE — 96376 TX/PRO/DX INJ SAME DRUG ADON: CPT

## 2022-05-08 PROCEDURE — 84100 ASSAY OF PHOSPHORUS: CPT

## 2022-05-08 PROCEDURE — 3700000000 HC ANESTHESIA ATTENDED CARE: Performed by: SURGERY

## 2022-05-08 PROCEDURE — 6360000002 HC RX W HCPCS: Performed by: SURGERY

## 2022-05-08 PROCEDURE — 83735 ASSAY OF MAGNESIUM: CPT

## 2022-05-08 PROCEDURE — 6370000000 HC RX 637 (ALT 250 FOR IP): Performed by: INTERNAL MEDICINE

## 2022-05-08 PROCEDURE — 6360000002 HC RX W HCPCS: Performed by: INTERNAL MEDICINE

## 2022-05-08 PROCEDURE — 7100000001 HC PACU RECOVERY - ADDTL 15 MIN: Performed by: SURGERY

## 2022-05-08 PROCEDURE — 3700000001 HC ADD 15 MINUTES (ANESTHESIA): Performed by: SURGERY

## 2022-05-08 PROCEDURE — 2580000003 HC RX 258: Performed by: SURGERY

## 2022-05-08 PROCEDURE — 85025 COMPLETE CBC W/AUTO DIFF WBC: CPT

## 2022-05-08 PROCEDURE — C1769 GUIDE WIRE: HCPCS | Performed by: SURGERY

## 2022-05-08 PROCEDURE — 2709999900 HC NON-CHARGEABLE SUPPLY: Performed by: SURGERY

## 2022-05-08 PROCEDURE — C2617 STENT, NON-COR, TEM W/O DEL: HCPCS | Performed by: SURGERY

## 2022-05-08 DEVICE — URETERAL STENT
Type: IMPLANTABLE DEVICE | Site: URETER | Status: FUNCTIONAL
Brand: CONTOUR™

## 2022-05-08 RX ORDER — MAGNESIUM HYDROXIDE 1200 MG/15ML
LIQUID ORAL
Status: COMPLETED | OUTPATIENT
Start: 2022-05-08 | End: 2022-05-08

## 2022-05-08 RX ORDER — DIPHENHYDRAMINE HCL 25 MG
25 TABLET ORAL EVERY 6 HOURS PRN
Status: DISCONTINUED | OUTPATIENT
Start: 2022-05-08 | End: 2022-05-09 | Stop reason: HOSPADM

## 2022-05-08 RX ORDER — SODIUM CHLORIDE 9 MG/ML
INJECTION, SOLUTION INTRAVENOUS PRN
Status: DISCONTINUED | OUTPATIENT
Start: 2022-05-08 | End: 2022-05-08 | Stop reason: HOSPADM

## 2022-05-08 RX ORDER — SODIUM CHLORIDE 0.9 % (FLUSH) 0.9 %
5-40 SYRINGE (ML) INJECTION PRN
Status: DISCONTINUED | OUTPATIENT
Start: 2022-05-08 | End: 2022-05-08 | Stop reason: HOSPADM

## 2022-05-08 RX ORDER — ONDANSETRON 2 MG/ML
4 INJECTION INTRAMUSCULAR; INTRAVENOUS
Status: DISCONTINUED | OUTPATIENT
Start: 2022-05-08 | End: 2022-05-08 | Stop reason: HOSPADM

## 2022-05-08 RX ORDER — SODIUM CHLORIDE 0.9 % (FLUSH) 0.9 %
5-40 SYRINGE (ML) INJECTION EVERY 12 HOURS SCHEDULED
Status: DISCONTINUED | OUTPATIENT
Start: 2022-05-08 | End: 2022-05-08 | Stop reason: HOSPADM

## 2022-05-08 RX ORDER — FENTANYL CITRATE 50 UG/ML
25 INJECTION, SOLUTION INTRAMUSCULAR; INTRAVENOUS EVERY 5 MIN PRN
Status: DISCONTINUED | OUTPATIENT
Start: 2022-05-08 | End: 2022-05-08 | Stop reason: HOSPADM

## 2022-05-08 RX ORDER — PHENAZOPYRIDINE HYDROCHLORIDE 100 MG/1
100 TABLET, FILM COATED ORAL 3 TIMES DAILY PRN
Status: DISCONTINUED | OUTPATIENT
Start: 2022-05-08 | End: 2022-05-09 | Stop reason: HOSPADM

## 2022-05-08 RX ORDER — PROPOFOL 10 MG/ML
INJECTION, EMULSION INTRAVENOUS PRN
Status: DISCONTINUED | OUTPATIENT
Start: 2022-05-08 | End: 2022-05-08 | Stop reason: SDUPTHER

## 2022-05-08 RX ORDER — MIDAZOLAM HYDROCHLORIDE 1 MG/ML
INJECTION INTRAMUSCULAR; INTRAVENOUS PRN
Status: DISCONTINUED | OUTPATIENT
Start: 2022-05-08 | End: 2022-05-08 | Stop reason: SDUPTHER

## 2022-05-08 RX ORDER — FENTANYL CITRATE 50 UG/ML
50 INJECTION, SOLUTION INTRAMUSCULAR; INTRAVENOUS EVERY 5 MIN PRN
Status: DISCONTINUED | OUTPATIENT
Start: 2022-05-08 | End: 2022-05-08 | Stop reason: HOSPADM

## 2022-05-08 RX ORDER — FENTANYL CITRATE 50 UG/ML
INJECTION, SOLUTION INTRAMUSCULAR; INTRAVENOUS PRN
Status: DISCONTINUED | OUTPATIENT
Start: 2022-05-08 | End: 2022-05-08 | Stop reason: SDUPTHER

## 2022-05-08 RX ADMIN — DULOXETINE HYDROCHLORIDE 120 MG: 60 CAPSULE, DELAYED RELEASE ORAL at 11:55

## 2022-05-08 RX ADMIN — OXYCODONE 5 MG: 5 TABLET ORAL at 17:46

## 2022-05-08 RX ADMIN — PANTOPRAZOLE SODIUM 40 MG: 40 TABLET, DELAYED RELEASE ORAL at 06:36

## 2022-05-08 RX ADMIN — ONDANSETRON 4 MG: 4 TABLET, ORALLY DISINTEGRATING ORAL at 12:42

## 2022-05-08 RX ADMIN — PANCRELIPASE 108000 UNITS: 24000; 76000; 120000 CAPSULE, DELAYED RELEASE PELLETS ORAL at 21:14

## 2022-05-08 RX ADMIN — SODIUM CHLORIDE, POTASSIUM CHLORIDE, SODIUM LACTATE AND CALCIUM CHLORIDE: 600; 310; 30; 20 INJECTION, SOLUTION INTRAVENOUS at 11:12

## 2022-05-08 RX ADMIN — HYDROMORPHONE HYDROCHLORIDE 0.5 MG: 1 INJECTION, SOLUTION INTRAMUSCULAR; INTRAVENOUS; SUBCUTANEOUS at 12:44

## 2022-05-08 RX ADMIN — PANCRELIPASE 108000 UNITS: 24000; 76000; 120000 CAPSULE, DELAYED RELEASE PELLETS ORAL at 12:41

## 2022-05-08 RX ADMIN — TRAMADOL HYDROCHLORIDE 100 MG: 50 TABLET, COATED ORAL at 21:13

## 2022-05-08 RX ADMIN — SODIUM CHLORIDE, POTASSIUM CHLORIDE, SODIUM LACTATE AND CALCIUM CHLORIDE: 600; 310; 30; 20 INJECTION, SOLUTION INTRAVENOUS at 02:51

## 2022-05-08 RX ADMIN — Medication 1000 UNITS: at 11:56

## 2022-05-08 RX ADMIN — PANCRELIPASE 108000 UNITS: 24000; 76000; 120000 CAPSULE, DELAYED RELEASE PELLETS ORAL at 17:48

## 2022-05-08 RX ADMIN — MIDAZOLAM 2 MG: 1 INJECTION INTRAMUSCULAR; INTRAVENOUS at 09:10

## 2022-05-08 RX ADMIN — Medication 10 ML: at 12:48

## 2022-05-08 RX ADMIN — PROPOFOL 100 MG: 10 INJECTION, EMULSION INTRAVENOUS at 09:10

## 2022-05-08 RX ADMIN — TAMSULOSIN HYDROCHLORIDE 0.4 MG: 0.4 CAPSULE ORAL at 11:55

## 2022-05-08 RX ADMIN — Medication 10 ML: at 21:14

## 2022-05-08 RX ADMIN — ENOXAPARIN SODIUM 40 MG: 100 INJECTION SUBCUTANEOUS at 11:54

## 2022-05-08 RX ADMIN — Medication 9 MG: at 21:13

## 2022-05-08 RX ADMIN — DIPHENHYDRAMINE HCL 25 MG: 25 TABLET ORAL at 16:42

## 2022-05-08 RX ADMIN — ZOLPIDEM TARTRATE 10 MG: 5 TABLET ORAL at 22:14

## 2022-05-08 RX ADMIN — HYDROMORPHONE HYDROCHLORIDE 1 MG: 1 INJECTION, SOLUTION INTRAMUSCULAR; INTRAVENOUS; SUBCUTANEOUS at 02:52

## 2022-05-08 RX ADMIN — TRAZODONE HYDROCHLORIDE 200 MG: 100 TABLET ORAL at 21:12

## 2022-05-08 RX ADMIN — OXYCODONE 5 MG: 5 TABLET ORAL at 11:09

## 2022-05-08 RX ADMIN — FENTANYL CITRATE 50 MCG: 50 INJECTION INTRAMUSCULAR; INTRAVENOUS at 09:10

## 2022-05-08 RX ADMIN — CEFEPIME HYDROCHLORIDE 1000 MG: 1 INJECTION, POWDER, FOR SOLUTION INTRAMUSCULAR; INTRAVENOUS at 16:55

## 2022-05-08 RX ADMIN — HYDRALAZINE HYDROCHLORIDE 10 MG: 20 INJECTION INTRAMUSCULAR; INTRAVENOUS at 21:14

## 2022-05-08 RX ADMIN — OXYCODONE 5 MG: 5 TABLET ORAL at 22:13

## 2022-05-08 ASSESSMENT — PAIN SCALES - GENERAL
PAINLEVEL_OUTOF10: 7
PAINLEVEL_OUTOF10: 0
PAINLEVEL_OUTOF10: 7
PAINLEVEL_OUTOF10: 0
PAINLEVEL_OUTOF10: 9
PAINLEVEL_OUTOF10: 0
PAINLEVEL_OUTOF10: 9
PAINLEVEL_OUTOF10: 0
PAINLEVEL_OUTOF10: 8
PAINLEVEL_OUTOF10: 9
PAINLEVEL_OUTOF10: 0
PAINLEVEL_OUTOF10: 9

## 2022-05-08 ASSESSMENT — PAIN DESCRIPTION - LOCATION
LOCATION: ABDOMEN
LOCATION: ABDOMEN;BACK
LOCATION: ABDOMEN

## 2022-05-08 ASSESSMENT — PAIN DESCRIPTION - ORIENTATION
ORIENTATION: LOWER

## 2022-05-08 ASSESSMENT — PAIN DESCRIPTION - DESCRIPTORS
DESCRIPTORS: SHOOTING
DESCRIPTORS: SHOOTING
DESCRIPTORS: SHARP;SHOOTING
DESCRIPTORS: SHARP;SHOOTING
DESCRIPTORS: ACHING
DESCRIPTORS: ACHING;DISCOMFORT;DULL;TENDER;SORE

## 2022-05-08 ASSESSMENT — PULMONARY FUNCTION TESTS
PIF_VALUE: 0
PIF_VALUE: 1
PIF_VALUE: 0
PIF_VALUE: 1

## 2022-05-08 ASSESSMENT — PAIN - FUNCTIONAL ASSESSMENT
PAIN_FUNCTIONAL_ASSESSMENT: 0-10
PAIN_FUNCTIONAL_ASSESSMENT: PREVENTS OR INTERFERES SOME ACTIVE ACTIVITIES AND ADLS
PAIN_FUNCTIONAL_ASSESSMENT: PREVENTS OR INTERFERES SOME ACTIVE ACTIVITIES AND ADLS
PAIN_FUNCTIONAL_ASSESSMENT: ACTIVITIES ARE NOT PREVENTED
PAIN_FUNCTIONAL_ASSESSMENT: PREVENTS OR INTERFERES SOME ACTIVE ACTIVITIES AND ADLS
PAIN_FUNCTIONAL_ASSESSMENT: PREVENTS OR INTERFERES WITH MANY ACTIVE NOT PASSIVE ACTIVITIES

## 2022-05-08 ASSESSMENT — PAIN SCALES - WONG BAKER
WONGBAKER_NUMERICALRESPONSE: 0

## 2022-05-08 ASSESSMENT — PAIN DESCRIPTION - FREQUENCY
FREQUENCY: INTERMITTENT

## 2022-05-08 ASSESSMENT — PAIN DESCRIPTION - ONSET
ONSET: GRADUAL

## 2022-05-08 ASSESSMENT — PAIN DESCRIPTION - PAIN TYPE
TYPE: ACUTE PAIN

## 2022-05-08 ASSESSMENT — PAIN DESCRIPTION - DIRECTION: RADIATING_TOWARDS: RIGHT

## 2022-05-08 ASSESSMENT — LIFESTYLE VARIABLES: SMOKING_STATUS: 0

## 2022-05-08 NOTE — ANESTHESIA PRE PROCEDURE
[facility] Department of Anesthesiology  Pre-Anesthesia Evaluation/Consultation       Name:  Taryn Rhodes  : 1965  Age:  65 y.o. MRN:  [id number]  Date: 2022           Surgeon: Surgeon(s):  Sandra Ruby MD    Procedure: Procedure(s):  CYSTOSCOPY URETERAL STENT INSERTION     Allergies   Allergen Reactions    Morphine      HEADACHE    Vicodin [Hydrocodone-Acetaminophen] Other (See Comments)     headache     Patient Active Problem List   Diagnosis    Acute cystitis with hematuria     Past Medical History:   Diagnosis Date    Arthritis     Frequency of urination     GERD (gastroesophageal reflux disease)     Hypertension     has not taken meds for 2 weeks    Migraines, neuralgic     Pancreatitis      Past Surgical History:   Procedure Laterality Date    BUNIONECTOMY       SECTION      CHOLECYSTECTOMY      COLECTOMY      ERCP      STENT, MULTIPLE    ERCP  2011     STENT REMOVAL, 5 FR 5 SM STENT PLACEMENT    ERCP      HYSTERECTOMY      JOINT REPLACEMENT      left knee    TUBAL LIGATION      UPPER GASTROINTESTINAL ENDOSCOPY  13    WITH BIOPSY AND BOTOX INJECTION    UPPER GASTROINTESTINAL ENDOSCOPY  3/25/14    with botox injection    UPPER GASTROINTESTINAL ENDOSCOPY  10-24-14    Esophagogastroduodenoscopy with Botulinum toxin injection of the pylorus    UPPER GASTROINTESTINAL ENDOSCOPY  3/24/15    WITH BOTOX INJECTION     Social History     Tobacco Use    Smoking status: Never Smoker    Smokeless tobacco: Never Used   Substance Use Topics    Alcohol use: No    Drug use: No     Medications  No current facility-administered medications on file prior to encounter.      Current Outpatient Medications on File Prior to Encounter   Medication Sig Dispense Refill    acetaminophen (TYLENOL) 500 MG tablet Take 1,000 mg by mouth in the morning and at bedtime      lipase-protease-amylase (CREON) 93197-695773 units CPEP delayed release capsule Take 3 capsules by mouth 4 times daily      estradiol (VIVELLE) 0.0375 MG/24HR Place 1 patch onto the skin Twice a Week Wednesdays and Sundays      hydroxychloroquine (PLAQUENIL) 200 MG tablet Take 300 mg by mouth daily      Lifitegrast (XIIDRA) 5 % SOLN Place 1 drop into both eyes daily      traMADol (ULTRAM) 50 MG tablet Take 100 mg by mouth every evening.  ondansetron (ZOFRAN-ODT) 4 MG disintegrating tablet Take 4 mg by mouth as needed for Nausea or Vomiting      vitamin D (CHOLECALCIFEROL) 25 MCG (1000 UT) TABS tablet Take 1,000 Units by mouth daily      cevimeline (EVOXAC) 30 MG capsule Take 30 mg by mouth 3 times daily      DULoxetine (CYMBALTA) 60 MG extended release capsule Take 120 mg by mouth daily       pantoprazole (PROTONIX) 40 MG tablet Take 1 tablet by mouth daily      zolpidem (AMBIEN) 10 MG tablet Take 10 mg by mouth nightly.       traZODone (DESYREL) 100 MG tablet Take 200 mg by mouth nightly       Melatonin 10 MG TABS Take 10 mg by mouth nightly       acyclovir (ZOVIRAX) 400 MG tablet Take 400 mg by mouth daily        Current Facility-Administered Medications   Medication Dose Route Frequency Provider Last Rate Last Admin    tamsulosin (FLOMAX) capsule 0.4 mg  0.4 mg Oral Daily Michelle Lyn MD        lactated ringers infusion   IntraVENous Continuous Michelle Lyn  mL/hr at 05/08/22 0647 Rate Verify at 05/08/22 0647    oxyCODONE (ROXICODONE) immediate release tablet 5 mg  5 mg Oral Q4H PRN Michelle Lyn MD        HYDROmorphone (DILAUDID) injection 0.5 mg  0.5 mg IntraVENous Q3H PRN Michelle Lyn MD        Or    HYDROmorphone (DILAUDID) injection 1 mg  1 mg IntraVENous Q4H PRN Michelle Lyn MD   1 mg at 05/08/22 0252    cevimeline (EVOXAC) capsule 30 mg  30 mg Oral TID Michelle Lyn MD        pantoprazole (PROTONIX) tablet 40 mg  40 mg Oral QAM AC Michelle Lyn MD   40 mg at 05/08/22 0636    hydrALAZINE (APRESOLINE) injection 10 mg  10 mg IntraVENous Q6H PRN Ora Shields MD        sodium chloride flush 0.9 % injection 5-40 mL  5-40 mL IntraVENous 2 times per day Ora Shields MD        sodium chloride flush 0.9 % injection 10 mL  10 mL IntraVENous PRN Ora Shields MD        0.9 % sodium chloride infusion   IntraVENous PRN Ora Shields MD        enoxaparin (LOVENOX) injection 40 mg  40 mg SubCUTAneous Daily Ora Shields MD        ondansetron (ZOFRAN-ODT) disintegrating tablet 4 mg  4 mg Oral Q8H PRN Ora Shields MD        Or    ondansetron (ZOFRAN) injection 4 mg  4 mg IntraVENous Q6H PRN Ora Shields MD        polyethylene glycol (GLYCOLAX) packet 17 g  17 g Oral Daily PRN Ora Shields MD        acetaminophen (TYLENOL) tablet 650 mg  650 mg Oral Q6H PRN Ora Shields MD        Or    acetaminophen (TYLENOL) suppository 650 mg  650 mg Rectal Q6H PRN Ora Shields MD        cefTRIAXone (ROCEPHIN) 1000 mg IVPB in 50 mL D5W minibag  1,000 mg IntraVENous Daily Ora Shields MD        lipase-protease-amylase (CREON) delayed release capsule 108,000 Units  108,000 Units Oral TID WC Ora Shields MD        DULoxetine (CYMBALTA) extended release capsule 120 mg  120 mg Oral Daily Ora Shields MD        estradiol (VIVELLE) 0.0375 MG/24HR 1 patch  1 patch TransDERmal Once per day on Sun Wed Ora Shields MD        melatonin tablet 9 mg  9 mg Oral Nightly PRN Ora Shields MD   9 mg at 05/07/22 2018    traZODone (DESYREL) tablet 200 mg  200 mg Oral Nightly Ora Shields MD   200 mg at 05/07/22 2019    lipase-protease-amylase (CREON) delayed release capsule 108,000 Units  108,000 Units Oral 4x daily Ora Shields MD   108,000 Units at 05/07/22 2142    traMADol (ULTRAM) tablet 100 mg  100 mg Oral Nightly Ora Shields MD   100 mg at 05/07/22 2018    vitamin D (CHOLECALCIFEROL) tablet 1,000 Units  1,000 Units Oral Daily MD Ruma Iverson zolpidem (AMBIEN) tablet 10 mg  10 mg Oral Nightly PRN MONALIAS Hubbard CNP   10 mg at 22 2140     Vital Signs (Current)   Vitals:    22 0252 22 0322 22 0532 22 0814   BP:   115/74 (!) 149/81   Pulse:   65 73   Resp: 18 18 18 18   Temp:   97.9 °F (36.6 °C) 97 °F (36.1 °C)   TempSrc:   Oral Temporal   SpO2:   98% 100%   Weight:   135 lb 9.3 oz (61.5 kg)    Height:                                                Vital Signs Statistics (for past 48 hrs)     Temp  Av.8 °F (36.6 °C)  Min: 97 °F (36.1 °C)   Min taken time: 22  Max: 98.2 °F (36.8 °C)   Max taken time: 22 1744  Pulse  Av.1  Min: 72   Min taken time: 22 0532  Max: 80   Max taken time: 22 1243  Resp  Av.4  Min: 13   Min taken time: 22 1243  Max: 25   Max taken time: 22 0814  BP  Min: 80/79   Min taken time: 22 1243  Max: 160/88   Max taken time: 22 1615  MAP (mmHg)  Av  Min: 87   Min taken time: 22 1744  Max: 113   Max taken time: 22 2022  SpO2  Av.8 %  Min: 98 %   Min taken time: 22 0532  Max: 100 %   Max taken time: 22 0814  BP Readings from Last 3 Encounters:   22 (!) 149/81   22 135/89   21 (!) 141/84       BMI  Body mass index is 24.02 kg/m². Estimated body mass index is 24.02 kg/m² as calculated from the following:    Height as of this encounter: 5' 3\" (1.6 m). Weight as of this encounter: 135 lb 9.3 oz (61.5 kg).     CBC   Lab Results   Component Value Date    WBC 2.8 2022    RBC 3.55 2022    HGB 10.7 2022    HCT 31.8 2022    MCV 89.7 2022    RDW 13.8 2022     2022     CMP    Lab Results   Component Value Date     2022    K 4.1 2022    K 4.6 2022     2022    CO2 23 2022    BUN 9 2022    CREATININE 1.0 2022    GFRAA >60 2022    GFRAA >60 2013    AGRATIO 1.5 2022    LABGLOM 57 05/08/2022    GLUCOSE 78 05/08/2022    PROT 5.2 05/08/2022    PROT 7.9 01/25/2013    CALCIUM 8.7 05/08/2022    BILITOT <0.2 05/08/2022    ALKPHOS 36 05/08/2022    AST 15 05/08/2022    ALT 14 05/08/2022     BMP    Lab Results   Component Value Date     05/08/2022    K 4.1 05/08/2022    K 4.6 05/07/2022     05/08/2022    CO2 23 05/08/2022    BUN 9 05/08/2022    CREATININE 1.0 05/08/2022    CALCIUM 8.7 05/08/2022    GFRAA >60 05/08/2022    GFRAA >60 01/25/2013    LABGLOM 57 05/08/2022    GLUCOSE 78 05/08/2022     POCGlucose  Recent Labs     05/07/22  1437 05/08/22  0722   GLUCOSE 79 78      Coags  No results found for: PROTIME, INR, APTT  HCG (If Applicable) No results found for: PREGTESTUR, PREGSERUM, HCG, HCGQUANT   ABGs No results found for: PHART, PO2ART, CDG5QJU, DYZ8EHF, BEART, Y3KSCAPM   Type & Screen (If Applicable)  No results found for: LABABO, LABRH                         BMI: Wt Readings from Last 3 Encounters:       NPO Status:   Date of last liquid consumption: 05/07/22   Time of last liquid consumption: 2000   Date of last solid food consumption: 05/07/22      Time of last solid consumption: 2000       Anesthesia Evaluation  Patient summary reviewed no history of anesthetic complications:   Airway: Mallampati: II  TM distance: >3 FB   Neck ROM: full  Mouth opening: > = 3 FB Dental: normal exam         Pulmonary: breath sounds clear to auscultation      (-) COPD, asthma, sleep apnea and not a current smoker                           Cardiovascular:    (+) hypertension:,     (-) past MI, CABG/stent,  angina and no hyperlipidemia        Rate: normal                    Neuro/Psych:   (+) headaches: migraine headaches,    (-) seizures, TIA and CVA           GI/Hepatic/Renal:   (+) GERD:, renal disease: kidney stones,           Endo/Other:        (-) diabetes mellitus, hypothyroidism               Abdominal:             Vascular:     - DVT and PE.       Other Findings:             Anesthesia Plan      MAC     ASA 3 - emergent       Induction: intravenous. MIPS: Postoperative opioids intended and Prophylactic antiemetics administered. Anesthetic plan and risks discussed with patient. This pre-anesthesia assessment may be used as a history and physical.    DOS STAFF ADDENDUM:    Pt seen and examined, chart reviewed (including anesthesia, drug and allergy history). No interval changes to history and physical examination. Anesthetic plan, risks, benefits, alternatives, and personnel involved discussed with patient. Patient verbalized an understanding and agrees to proceed.       Anca Henry MD  May 8, 2022  8:35 AM

## 2022-05-08 NOTE — PROGRESS NOTES
Pt admitted with likely distal left ureteral stone and UTI. Will proceed with left stent insertion tomorrow am  Please keep NPO after midnight  COVID sent.

## 2022-05-08 NOTE — PROGRESS NOTES
Patient alert and oriented x4 in bed ,out of the floor for surgery, informed consent completed and signed.

## 2022-05-08 NOTE — ANESTHESIA POSTPROCEDURE EVALUATION
Department of Anesthesiology  Postprocedure Note    Patient: Taryn Rhodes  MRN: [id number]  YOB: 1965  Date of evaluation: 5/8/2022  Time:  9:29 AM     Procedure Summary     Date: 05/08/22 Room / Location: [address] [place] [address]    Anesthesia Start: 0908 Anesthesia Stop:     Procedure: [address] (Left Ureter) Diagnosis: (unknown)    Surgeons: Sandra Ruby MD Responsible Provider: Tristin Colon MD    Anesthesia Type: MAC ASA Status: 3 - Emergent          Anesthesia Type: No value filed. Radha Phase I: Radha Score: 8    Radha Phase II:      Last vitals: Reviewed and per EMR flowsheets.        Anesthesia Post Evaluation    Patient location during evaluation: PACU  Patient participation: waiting for patient participation  Level of consciousness: awake  Pain score: 0  Airway patency: patent  Nausea & Vomiting: no nausea and no vomiting  Complications: no  Cardiovascular status: blood pressure returned to baseline  Respiratory status: acceptable  Hydration status: euvolemic

## 2022-05-08 NOTE — PLAN OF CARE
Problem: Discharge Planning  Goal: Discharge to home or other facility with appropriate resources  Outcome: Progressing  Flowsheets (Taken 5/8/2022 1551)  Discharge to home or other facility with appropriate resources:   Identify barriers to discharge with patient and caregiver   Identify discharge learning needs (meds, wound care, etc)   Refer to discharge planning if patient needs post-hospital services based on physician order or complex needs related to functional status, cognitive ability or social support system   Arrange for needed discharge resources and transportation as appropriate     Problem: Pain  Goal: Verbalizes/displays adequate comfort level or baseline comfort level  Outcome: Progressing  Flowsheets (Taken 5/8/2022 1551)  Verbalizes/displays adequate comfort level or baseline comfort level:   Encourage patient to monitor pain and request assistance   Assess pain using appropriate pain scale   Administer analgesics based on type and severity of pain and evaluate response   Implement non-pharmacological measures as appropriate and evaluate response   Notify Licensed Independent Practitioner if interventions unsuccessful or patient reports new pain

## 2022-05-08 NOTE — PROGRESS NOTES
Patient returned from surgery, remains drowsy, assessment and vital signs wnl colostomy bag.call light within reach .

## 2022-05-08 NOTE — CONSULTS
Reason for Consult: Kidney stone    History of Present Illness: Britni Jackson is a 65 y.o. who presents with 1 day of left flank pain nausea and vomiting. CT scan emergency room shows no significant hydronephrosis but calcification in the area of the left ureter indicating possible stone. Urinalysis shows evidence of infection.     Patient has prior history of kidney stones      Past Medical History:   Past Medical History:   Diagnosis Date    Arthritis     Frequency of urination     GERD (gastroesophageal reflux disease)     Hypertension     has not taken meds for 2 weeks    Migraines, neuralgic     Pancreatitis        Past Surgical History:  Past Surgical History:   Procedure Laterality Date    BUNIONECTOMY       SECTION      CHOLECYSTECTOMY      COLECTOMY      ERCP      STENT, MULTIPLE    ERCP  2011     STENT REMOVAL, 5 FR 5 SM STENT PLACEMENT    ERCP      HYSTERECTOMY      JOINT REPLACEMENT      left knee    TUBAL LIGATION      UPPER GASTROINTESTINAL ENDOSCOPY  13    WITH BIOPSY AND BOTOX INJECTION    UPPER GASTROINTESTINAL ENDOSCOPY  3/25/14    with botox injection    UPPER GASTROINTESTINAL ENDOSCOPY  10-24-14    Esophagogastroduodenoscopy with Botulinum toxin injection of the pylorus    UPPER GASTROINTESTINAL ENDOSCOPY  3/24/15    WITH BOTOX INJECTION       Social History:  Social History     Socioeconomic History    Marital status:      Spouse name: Not on file    Number of children: Not on file    Years of education: Not on file    Highest education level: Not on file   Occupational History    Not on file   Tobacco Use    Smoking status: Never Smoker    Smokeless tobacco: Never Used   Substance and Sexual Activity    Alcohol use: No    Drug use: No    Sexual activity: Not on file   Other Topics Concern    Not on file   Social History Narrative    Not on file     Social Determinants of Health     Financial Resource Strain:    [facility] Difficulty of Paying Living Expenses: Not on file   Food Insecurity:     Worried About Running Out of Food in the Last Year: Not on file    Ran Out of Food in the Last Year: Not on file   Transportation Needs:     Lack of Transportation (Medical): Not on file    Lack of Transportation (Non-Medical): Not on file   Physical Activity:     Days of Exercise per Week: Not on file    Minutes of Exercise per Session: Not on file   Stress:     Feeling of Stress : Not on file   Social Connections:     Frequency of Communication with Friends and Family: Not on file    Frequency of Social Gatherings with Friends and Family: Not on file    Attends Religion Services: Not on file    Active Member of [address] [place] [address] [employer] or Organizations: Not on file    Attends Club or Organization Meetings: Not on file    Marital Status: Not on file   Intimate Partner Violence:     Fear of Current or Ex-Partner: Not on file    Emotionally Abused: Not on file    Physically Abused: Not on file    Sexually Abused: Not on file   Housing Stability:     Unable to Pay for Housing in the Last Year: Not on file    Number of [place] in the Last Year: Not on file    Unstable Housing in the Last Year: Not on file       Family History:  Family History   Problem Relation Age of Onset    Diabetes Mother     Cancer Mother     Arthritis Mother     High Blood Pressure Mother     High Blood Pressure Sister     Cancer Maternal Aunt     Diabetes Maternal Grandmother     Anesth Problems Neg Hx     Malig Hyperten Neg Hx     Hypotension Neg Hx     Malig Hypertherm Neg Hx     Pseudochol.  Deficiency Neg Hx        Meds:   Current Facility-Administered Medications: phenazopyridine (PYRIDIUM) tablet 100 mg, 100 mg, Oral, TID PRN  tamsulosin (FLOMAX) capsule 0.4 mg, 0.4 mg, Oral, Daily  lactated ringers infusion, , IntraVENous, Continuous  oxyCODONE (ROXICODONE) immediate release tablet 5 mg, 5 mg, Oral, Q4H PRN  HYDROmorphone (DILAUDID) injection 0.5 mg, 0.5 mg, IntraVENous, Q3H PRN **OR** HYDROmorphone (DILAUDID) injection 1 mg, 1 mg, IntraVENous, Q4H PRN  cevimeline (EVOXAC) capsule 30 mg, 30 mg, Oral, TID  pantoprazole (PROTONIX) tablet 40 mg, 40 mg, Oral, QAM AC  hydrALAZINE (APRESOLINE) injection 10 mg, 10 mg, IntraVENous, Q6H PRN  sodium chloride flush 0.9 % injection 5-40 mL, 5-40 mL, IntraVENous, 2 times per day  sodium chloride flush 0.9 % injection 10 mL, 10 mL, IntraVENous, PRN  0.9 % sodium chloride infusion, , IntraVENous, PRN  enoxaparin (LOVENOX) injection 40 mg, 40 mg, SubCUTAneous, Daily  ondansetron (ZOFRAN-ODT) disintegrating tablet 4 mg, 4 mg, Oral, Q8H PRN **OR** ondansetron (ZOFRAN) injection 4 mg, 4 mg, IntraVENous, Q6H PRN  polyethylene glycol (GLYCOLAX) packet 17 g, 17 g, Oral, Daily PRN  acetaminophen (TYLENOL) tablet 650 mg, 650 mg, Oral, Q6H PRN **OR** acetaminophen (TYLENOL) suppository 650 mg, 650 mg, Rectal, Q6H PRN  cefTRIAXone (ROCEPHIN) 1000 mg IVPB in 50 mL D5W minibag, 1,000 mg, IntraVENous, Daily  lipase-protease-amylase (CREON) delayed release capsule 108,000 Units, 108,000 Units, Oral, TID WC  DULoxetine (CYMBALTA) extended release capsule 120 mg, 120 mg, Oral, Daily  estradiol (VIVELLE) 0.0375 MG/24HR 1 patch, 1 patch, TransDERmal, Once per day on Sun Wed  melatonin tablet 9 mg, 9 mg, Oral, Nightly PRN  traZODone (DESYREL) tablet 200 mg, 200 mg, Oral, Nightly  lipase-protease-amylase (CREON) delayed release capsule 108,000 Units, 108,000 Units, Oral, 4x daily  traMADol (ULTRAM) tablet 100 mg, 100 mg, Oral, Nightly  vitamin D (CHOLECALCIFEROL) tablet 1,000 Units, 1,000 Units, Oral, Daily  zolpidem (AMBIEN) tablet 10 mg, 10 mg, Oral, Nightly PRN    Vitals:  /79   Pulse 69   Temp 98.7 °F (37.1 °C) (Temporal)   Resp 12   Ht 5' 3\" (1.6 m)   Wt 135 lb 9.3 oz (61.5 kg)   SpO2 97%   BMI 24.02 kg/m²     Intake/Output Summary (Last 24 hours) at 5/8/2022 1002  Last data filed at 5/8/2022 0647  Gross per 24 hour   Intake 3549.19 ml   Output 800 ml   Net 2749.19 ml       Review of Systems:  10 Systems were reviewed and negative except as in HPI      Physical Exam:  General Appearance: Alert and oriented, cooperative, no distress, appears stated age  Head: Normocephalic, without obvious abnormality, atraumatic  Back: no CVA tenderness  Lungs: respirations unlabored, no wheezing  Heart: Regular rate and rhythm, no lower extremity edema noted  Abdomen: Soft, non-tender, non-distended, no masses  Skin: Skin color, texture, turgor normal, no rashes or lesions  Neurologic: no gross deficits  Female :    Bladder is non tender   No CVA tenderness       Labs:  CBC   Lab Results   Component Value Date    WBC 2.8 05/08/2022    RBC 3.55 05/08/2022    HGB 10.7 05/08/2022    HCT 31.8 05/08/2022    MCV 89.7 05/08/2022    MCH 30.1 05/08/2022    MCHC 33.6 05/08/2022    RDW 13.8 05/08/2022     05/08/2022    MPV 8.8 05/08/2022     BMP   Lab Results   Component Value Date     05/08/2022    K 4.1 05/08/2022    K 4.6 05/07/2022     05/08/2022    CO2 23 05/08/2022    BUN 9 05/08/2022    CREATININE 1.0 05/08/2022    GLUCOSE 78 05/08/2022    CALCIUM 8.7 05/08/2022       Urinalysis:   Lab Results   Component Value Date    COLORU Yellow 05/07/2022    GLUCOSEU Negative 05/07/2022    BLOODU TRACE-LYSED 05/07/2022    NITRU POSITIVE 05/07/2022    LEUKOCYTESUR Negative 05/07/2022       Imaging: Possible 3 mm distal left ureteral stone. No significant renal stone   pertinent images and radiologist's report were reviewed independently      Impression/Plan:   Possible 3 mm distal left ureteral stone. No significant renal stones  Urinary tract infection    -Patient is counseled on presumed diagnosis, we elect to proceed with cystoscopy and left ureteral stent insertion. Postoperatively she should be observed for 24 hours.   If she remains afebrile can be discharged on oral antibiotics, Pyridium and oral pain medicine as needed    She will need follow-up ureteroscopy in 1 to 2 weeks      Saravanan Ladd MD 5/8/202210:02 AM

## 2022-05-08 NOTE — BRIEF OP NOTE
Brief Postoperative Note      Patient: Balta Redmond  YOB: 1965  MRN: [id number]    Date of Procedure: 5/8/2022    Pre-Op Diagnosis: left ureteral calculus, UTI    Post-Op Diagnosis: Same       Procedure(s):  CYSTOSCOPY URETERAL STENT INSERTION    Surgeon(s):  Rose Escobar MD    Assistant:  Surgical Assistant: Aviva Matta    Anesthesia: General    Estimated Blood Loss (mL): Minimal    Complications: None    Specimens:   * No specimens in log *    Implants:  Implant Name Type Inv.  Item Serial No.  Lot No. LRB No. Used Action   STENT URET 6FR L24CM PERCFLX HYDR+ SFT PGTL TAPR TIP W/O - [id number]  STENT URET 6FR L24CM PERCFLX HYDR+ SFT PGTL TAPR TIP W/O  [employer]- [id number] Left 1 Implanted         Drains:   Colostomy RLQ (Active)       Findings:     Electronically signed by Rose Escobar MD on 5/8/2022 at 9:28 AM

## 2022-05-08 NOTE — OP NOTE
[address] [place] [address] Isaiah Gavin 17                                OPERATIVE REPORT    PATIENT NAME: Daniel Donovan                   :        1965  MED REC NO:   [id number]                          ROOM:       [id number]  ACCOUNT NO:   [de-identified]                           ADMIT DATE: 2022  PROVIDER:     Isidra Saxena MD    DATE OF PROCEDURE:  2022    PREOPERATIVE DIAGNOSES:  1. Presumed left ureteral calculus. 2.  Urinary tract infection. POSTOPERATIVE DIAGNOSES:  1. Presumed left ureteral calculus. 2.  Urinary tract infection. OPERATION PERFORMED:  Cystoscopy and left ureteral stent insertion. SURGEON:  Isidra Saxena MD    ANESTHESIA:  General.    EBL:  Zero. .    INDICATIONS:  This is a 76-year-old female who presents with left flank  pain, nausea, and vomiting. CT scan demonstrates a calcification in the  area of the distal left ureter, concerning for stone. She has no  significant hydronephrosis or renal stones. She has evidence of urinary  tract infection. Given these findings, we discussed the options and  elected to proceed with left ureteral stent insertion. Risks, benefits  and alternatives were discussed, and she signed informed consent. OPERATIVE PROCEDURE:  She was brought to the operating room where  anesthesia was administered. She was positioned in dorsal lithotomy and  prepped and draped in a sterile fashion. Preoperative antibiotics were  administered, and a timeout was performed. A 21-German cystoscope was  passed atraumatically through the urethra into the bladder. The bladder  was examined, and was unremarkable. The left ureteral orifice was  identified and cannulated with a Sensor wire which was advanced under  fluoroscopic guidance into the left kidney. After placement of the  wire, there was efflux of slightly cloudy urine from the left kidney.   I  elected to proceed with stent insertion. A 6 x 24 stent was placed  under fluoroscopic guidance with the proximal aspect curled in the  kidney. The guidewire was removed, and a good curl was visualized in  the bladder. The bladder was drained. The patient was awoken and taken to recovery  in a stable condition having tolerated the procedure. She will be  readmitted to the medical service, and discharged when clinically  stable. We will schedule definitive ureteroscopy in one to two weeks.         Murray Vaughan MD    D: 05/08/2022 10:07:27       T: 05/08/2022 12:32:39     [email or web address]  Job#: [id number]     Doc#: [id number]    CC:

## 2022-05-08 NOTE — PLAN OF CARE
Problem: Discharge Planning  Goal: Discharge to home or other facility with appropriate resources  5/7/2022 2330 by Corrie Oliveros RN  Outcome: Progressing  5/7/2022 1841 by Leander Barakat RN  Outcome: Progressing  5/7/2022 1840 by Leander Barakat RN  Outcome: Progressing  Flowsheets  Taken 5/7/2022 1818  Discharge to home or other facility with appropriate resources:   Identify barriers to discharge with patient and caregiver   Refer to discharge planning if patient needs post-hospital services based on physician order or complex needs related to functional status, cognitive ability or social support system   Identify discharge learning needs (meds, wound care, etc)  Taken 5/7/2022 1744  Discharge to home or other facility with appropriate resources:   Identify barriers to discharge with patient and caregiver   Arrange for needed discharge resources and transportation as appropriate   Identify discharge learning needs (meds, wound care, etc)   Continuing to work with patient and health care team on discharge plan. Discharge instructions and medication management will be reviewed prior to discharge. Problem: Pain  Goal: Verbalizes/displays adequate comfort level or baseline comfort level  5/7/2022 2330 by Corrie Oliveros RN  Outcome: Progressing  5/7/2022 1841 by Leander Barakat RN  Outcome: Progressing  5/7/2022 1840 by Leander Barakat RN  Outcome: Progressing   Pt able to express presence/absence of pain and rate pain appropriately using numerical scale. Pain/discomfort being managed with PRN analgesics per MD orders (see MAR). Pain assessed every shift and after interventions.

## 2022-05-08 NOTE — PROGRESS NOTES
Patient alert and oriented, awakes and requested pain medication at the scale of 0-10 Pt states 9, oxy 5mg administered as ordered. Pt C/O of itching Dr contacted and new ordered of benadryl . L R IV fluid D/C at 1700 and void 900 ml light pink, no discomfort noted. Pt up in chair and family at bedside. All fall precautions in place. call light within reach.

## 2022-05-09 VITALS
BODY MASS INDEX: 23.96 KG/M2 | RESPIRATION RATE: 16 BRPM | DIASTOLIC BLOOD PRESSURE: 78 MMHG | HEIGHT: 63 IN | HEART RATE: 92 BPM | TEMPERATURE: 98.3 F | OXYGEN SATURATION: 98 % | SYSTOLIC BLOOD PRESSURE: 120 MMHG | WEIGHT: 135.2 LBS

## 2022-05-09 LAB
A/G RATIO: 1.3 (ref 1.1–2.2)
ALBUMIN SERPL-MCNC: 3.5 G/DL (ref 3.4–5)
ALP BLD-CCNC: 43 U/L (ref 40–129)
ALT SERPL-CCNC: 13 U/L (ref 10–40)
ANION GAP SERPL CALCULATED.3IONS-SCNC: 10 MMOL/L (ref 3–16)
AST SERPL-CCNC: 19 U/L (ref 15–37)
BASOPHILS ABSOLUTE: 0 K/UL (ref 0–0.2)
BASOPHILS RELATIVE PERCENT: 0.5 %
BILIRUB SERPL-MCNC: <0.2 MG/DL (ref 0–1)
BUN BLDV-MCNC: 9 MG/DL (ref 7–20)
CALCIUM SERPL-MCNC: 9.3 MG/DL (ref 8.3–10.6)
CHLORIDE BLD-SCNC: 102 MMOL/L (ref 99–110)
CO2: 25 MMOL/L (ref 21–32)
CREAT SERPL-MCNC: 0.8 MG/DL (ref 0.6–1.1)
EOSINOPHILS ABSOLUTE: 0 K/UL (ref 0–0.6)
EOSINOPHILS RELATIVE PERCENT: 0.7 %
GFR AFRICAN AMERICAN: >60
GFR NON-AFRICAN AMERICAN: >60
GLUCOSE BLD-MCNC: 83 MG/DL (ref 70–99)
HCT VFR BLD CALC: 34.1 % (ref 36–48)
HEMOGLOBIN: 11.6 G/DL (ref 12–16)
LYMPHOCYTES ABSOLUTE: 0.6 K/UL (ref 1–5.1)
LYMPHOCYTES RELATIVE PERCENT: 18.3 %
MAGNESIUM: 1.7 MG/DL (ref 1.8–2.4)
MCH RBC QN AUTO: 30 PG (ref 26–34)
MCHC RBC AUTO-ENTMCNC: 33.9 G/DL (ref 31–36)
MCV RBC AUTO: 88.5 FL (ref 80–100)
MONOCYTES ABSOLUTE: 0.4 K/UL (ref 0–1.3)
MONOCYTES RELATIVE PERCENT: 12.9 %
NEUTROPHILS ABSOLUTE: 2.1 K/UL (ref 1.7–7.7)
NEUTROPHILS RELATIVE PERCENT: 67.6 %
ORGANISM: ABNORMAL
PDW BLD-RTO: 13.8 % (ref 12.4–15.4)
PHOSPHORUS: 3.5 MG/DL (ref 2.5–4.9)
PLATELET # BLD: 123 K/UL (ref 135–450)
PMV BLD AUTO: 9.1 FL (ref 5–10.5)
POTASSIUM SERPL-SCNC: 4 MMOL/L (ref 3.5–5.1)
RBC # BLD: 3.85 M/UL (ref 4–5.2)
SODIUM BLD-SCNC: 137 MMOL/L (ref 136–145)
TOTAL PROTEIN: 6.1 G/DL (ref 6.4–8.2)
URINE CULTURE, ROUTINE: ABNORMAL
WBC # BLD: 3.1 K/UL (ref 4–11)

## 2022-05-09 PROCEDURE — 6370000000 HC RX 637 (ALT 250 FOR IP): Performed by: SURGERY

## 2022-05-09 PROCEDURE — 84100 ASSAY OF PHOSPHORUS: CPT

## 2022-05-09 PROCEDURE — 85025 COMPLETE CBC W/AUTO DIFF WBC: CPT

## 2022-05-09 PROCEDURE — 83735 ASSAY OF MAGNESIUM: CPT

## 2022-05-09 PROCEDURE — 6360000002 HC RX W HCPCS: Performed by: INTERNAL MEDICINE

## 2022-05-09 PROCEDURE — G0378 HOSPITAL OBSERVATION PER HR: HCPCS

## 2022-05-09 PROCEDURE — 2580000003 HC RX 258: Performed by: SURGERY

## 2022-05-09 PROCEDURE — 6360000002 HC RX W HCPCS: Performed by: SURGERY

## 2022-05-09 PROCEDURE — 94760 N-INVAS EAR/PLS OXIMETRY 1: CPT

## 2022-05-09 PROCEDURE — 36415 COLL VENOUS BLD VENIPUNCTURE: CPT

## 2022-05-09 PROCEDURE — 80053 COMPREHEN METABOLIC PANEL: CPT

## 2022-05-09 PROCEDURE — 6370000000 HC RX 637 (ALT 250 FOR IP): Performed by: INTERNAL MEDICINE

## 2022-05-09 PROCEDURE — 2580000003 HC RX 258: Performed by: INTERNAL MEDICINE

## 2022-05-09 RX ORDER — PHENAZOPYRIDINE HYDROCHLORIDE 100 MG/1
100 TABLET, FILM COATED ORAL 3 TIMES DAILY PRN
Qty: 15 TABLET | Refills: 0 | Status: ON HOLD | OUTPATIENT
Start: 2022-05-09 | End: 2022-05-13 | Stop reason: HOSPADM

## 2022-05-09 RX ORDER — TRAMADOL HYDROCHLORIDE 50 MG/1
100 TABLET ORAL EVERY 8 HOURS PRN
Qty: 30 TABLET | Refills: 0 | Status: SHIPPED | OUTPATIENT
Start: 2022-05-09 | End: 2022-05-14

## 2022-05-09 RX ORDER — TAMSULOSIN HYDROCHLORIDE 0.4 MG/1
0.4 CAPSULE ORAL DAILY
Qty: 30 CAPSULE | Refills: 3 | Status: SHIPPED | OUTPATIENT
Start: 2022-05-10

## 2022-05-09 RX ORDER — TRAMADOL HYDROCHLORIDE 50 MG/1
50 TABLET ORAL EVERY 8 HOURS PRN
Qty: 12 TABLET | Refills: 0 | Status: SHIPPED | OUTPATIENT
Start: 2022-05-09 | End: 2022-05-09 | Stop reason: HOSPADM

## 2022-05-09 RX ORDER — CEFUROXIME AXETIL 250 MG/1
250 TABLET ORAL 2 TIMES DAILY
Qty: 14 TABLET | Refills: 0 | Status: ON HOLD | OUTPATIENT
Start: 2022-05-09 | End: 2022-05-13 | Stop reason: HOSPADM

## 2022-05-09 RX ADMIN — ONDANSETRON 4 MG: 2 INJECTION INTRAMUSCULAR; INTRAVENOUS at 12:47

## 2022-05-09 RX ADMIN — DIPHENHYDRAMINE HCL 25 MG: 25 TABLET ORAL at 12:51

## 2022-05-09 RX ADMIN — Medication 10 ML: at 09:02

## 2022-05-09 RX ADMIN — ENOXAPARIN SODIUM 40 MG: 100 INJECTION SUBCUTANEOUS at 08:59

## 2022-05-09 RX ADMIN — PHENAZOPYRIDINE HYDROCHLORIDE 100 MG: 100 TABLET ORAL at 09:15

## 2022-05-09 RX ADMIN — SODIUM CHLORIDE: 9 INJECTION, SOLUTION INTRAVENOUS at 04:32

## 2022-05-09 RX ADMIN — HYDROMORPHONE HYDROCHLORIDE 1 MG: 1 INJECTION, SOLUTION INTRAMUSCULAR; INTRAVENOUS; SUBCUTANEOUS at 12:51

## 2022-05-09 RX ADMIN — PANTOPRAZOLE SODIUM 40 MG: 40 TABLET, DELAYED RELEASE ORAL at 06:37

## 2022-05-09 RX ADMIN — PANCRELIPASE 72000 UNITS: 24000; 76000; 120000 CAPSULE, DELAYED RELEASE PELLETS ORAL at 12:47

## 2022-05-09 RX ADMIN — ACETAMINOPHEN 650 MG: 325 TABLET ORAL at 13:45

## 2022-05-09 RX ADMIN — CEFTRIAXONE 1000 MG: 1 INJECTION, POWDER, FOR SOLUTION INTRAMUSCULAR; INTRAVENOUS at 15:50

## 2022-05-09 RX ADMIN — DULOXETINE HYDROCHLORIDE 120 MG: 60 CAPSULE, DELAYED RELEASE ORAL at 08:59

## 2022-05-09 RX ADMIN — Medication 1000 UNITS: at 08:59

## 2022-05-09 RX ADMIN — CEFEPIME HYDROCHLORIDE 1000 MG: 1 INJECTION, POWDER, FOR SOLUTION INTRAMUSCULAR; INTRAVENOUS at 04:33

## 2022-05-09 RX ADMIN — OXYCODONE 5 MG: 5 TABLET ORAL at 17:39

## 2022-05-09 RX ADMIN — TAMSULOSIN HYDROCHLORIDE 0.4 MG: 0.4 CAPSULE ORAL at 08:59

## 2022-05-09 RX ADMIN — OXYCODONE 5 MG: 5 TABLET ORAL at 13:46

## 2022-05-09 RX ADMIN — PANCRELIPASE 72000 UNITS: 24000; 76000; 120000 CAPSULE, DELAYED RELEASE PELLETS ORAL at 17:39

## 2022-05-09 RX ADMIN — OXYCODONE 5 MG: 5 TABLET ORAL at 08:59

## 2022-05-09 RX ADMIN — PANCRELIPASE 72000 UNITS: 24000; 76000; 120000 CAPSULE, DELAYED RELEASE PELLETS ORAL at 09:25

## 2022-05-09 ASSESSMENT — PAIN DESCRIPTION - DESCRIPTORS
DESCRIPTORS: ACHING;SHARP
DESCRIPTORS: ACHING

## 2022-05-09 ASSESSMENT — PAIN SCALES - WONG BAKER: WONGBAKER_NUMERICALRESPONSE: 0

## 2022-05-09 ASSESSMENT — PAIN SCALES - GENERAL
PAINLEVEL_OUTOF10: 8
PAINLEVEL_OUTOF10: 7
PAINLEVEL_OUTOF10: 0
PAINLEVEL_OUTOF10: 8
PAINLEVEL_OUTOF10: 10
PAINLEVEL_OUTOF10: 7
PAINLEVEL_OUTOF10: 9
PAINLEVEL_OUTOF10: 7

## 2022-05-09 ASSESSMENT — PAIN DESCRIPTION - ORIENTATION
ORIENTATION: LOWER
ORIENTATION: LEFT

## 2022-05-09 ASSESSMENT — PAIN DESCRIPTION - LOCATION
LOCATION: ABDOMEN;BACK
LOCATION: ABDOMEN;BACK

## 2022-05-09 ASSESSMENT — PAIN - FUNCTIONAL ASSESSMENT: PAIN_FUNCTIONAL_ASSESSMENT: PREVENTS OR INTERFERES SOME ACTIVE ACTIVITIES AND ADLS

## 2022-05-09 NOTE — CARE COORDINATION
DISCHARGE PLAN: Pt plans to return home upon d/c. Pts boyfriend to transport her home at d/c. No d/c needs noted. Pt has HCPOA paperwork and will complete this at home.   ______________________________________    INITIAL ASSESSMENT  Met w/pt to address barriers to dc. HOME: Pt reported that she resides with her caregiver Edel Rodney) and family. Disease Specific: Ureteric stone, Acute UTI    COVID Vaccination: Yes    DME/O2: Pt has an ostomy and has ostomy supplies through [facility].  No other DME. ACTIVE SERVICES: Pt reported that she was independent with all self care PTA. TRANSPORTATION: Pt was an active  PTA and reported that her boyfriend will transport her home at d/c. PHARMACY: Denies difficulty obtaining/taking meds. Pt has all meds filled at [address] [facility] by [facility]. PCP: Dr. Pennelope Kawasaki: Verified address/phone number as correct    INSURANCE:  [employer]  PRESCRIPTION COVERAGE: Medicaid    HD/PD: No    THERAPY RECS  Not ordered    Discharge planning team will remain available for needs. Please consult for any specifics not addressed in this note.     SHEREE Hernandez  [phone]  Electronically signed by Caron Blake on 5/9/2022 at 11:52 AM

## 2022-05-09 NOTE — PROGRESS NOTES
Incorrect tramadol prescription sent from retail. Retail now closed. Tubed back to regular pharmacy per [address] in pharmacy. Correct prescription sent from Dr. Augusto Alfonso to [facility] on [address] [facility]. Reviewed dc instructions with pt. Pt verbalized understanding. PIV removed. Dressing clean dry and intact. Pt dc to private residence. Wheelchair to transport pt. To vehicle. Pt dc with personal belongings.

## 2022-05-09 NOTE — PLAN OF CARE
Problem: Discharge Planning  Goal: Discharge to home or other facility with appropriate resources  5/9/2022 0240 by Dora Hinds RN  Outcome: Progressing     Problem: Pain  Goal: Verbalizes/displays adequate comfort level or baseline comfort level  5/9/2022 0240 by Dora Hinds RN  Outcome: Progressing     Problem: Safety - Adult  Goal: Free from fall injury  Outcome: Progressing     Problem: ABCDS Injury Assessment  Goal: Absence of physical injury  Outcome: Progressing

## 2022-05-09 NOTE — PROGRESS NOTES
Comprehensive Nutrition Assessment    Type and Reason for Visit:  Initial,Positive Nutrition Screen    Nutrition Recommendations/Plan:   1. Add Ensure Clear bid to start     Malnutrition Assessment:  Malnutrition Status: At risk for malnutrition (Comment) (05/09/22 3995)    Context:  Acute Illness         Nutrition Assessment:    Pt triggered a positive screen for poor po and wt loss. PMH includes CKD (3), colostomy, HTN, GERD, Pancreatitis (need for pancreatic enzymes). Pt adm r/t  N/V abd & back pain. Found to have cystitis with hematuria. Pt is s/p cystoscopy with stent placement on 5/8, for presumed left renal calculus. Diet adv to regular but pt reported decreased po r/t pain at this time. Pt unable to take dairy based supplements, but agreed to try Ensure Clear. Will start bid and monitor progress. Noted wt loss trend, past 10 months, per records but not at significant rate. When asked about UBW, pt reported 127-129 lbs. Current wt this adm at 135 lbs 3.2 oz. Nutrition Related Findings:    Noted BM per colostomy. Noted no edema. Wound Type: None       Current Nutrition Intake & Therapies:    Average Meal Intake: 26-50%,51-75%     ADULT DIET; Regular    Anthropometric Measures:  Height: 5' 3\" (160 cm)  Ideal Body Weight (IBW): 115 lbs (52 kg)    Admission Body Weight: 132 lb (59.9 kg)  Current Body Weight: 135 lb (61.2 kg), 117.4 % IBW. Weight Source: Bed Scale  Current BMI (kg/m2): 23.9  Usual Body Weight:  (127-129 lbs per pt)                       BMI Categories: Normal Weight (BMI 18.5-24. 9)    Estimated Daily Nutrient Needs:        Energy (kcal/day): 1507-5276 (25-30 x ABW 61 kg)     Protein (g/day): 49-73 (.8-1.2 x ABW 61 kg (adj for CKD)  Method Used for Fluid Requirements: 1 ml/kcal  Fluid (ml/day):      Nutrition Diagnosis:   · Inadequate oral intake related to pain as evidenced by intake 26-50%,intake 51-75%      Nutrition Interventions:   Food and/or Nutrient Delivery: Continue Current Diet,Start Oral Nutrition Supplement  Nutrition Education/Counseling: Education not indicated  Coordination of Nutrition Care: Continue to monitor while inpatient       Goals:     Goals: PO intake 50% or greater       Nutrition Monitoring and Evaluation:   Behavioral-Environmental Outcomes: None Identified  Food/Nutrient Intake Outcomes: Food and Nutrient Intake,Supplement Intake  Physical Signs/Symptoms Outcomes: Biochemical Data,Constipation,Nausea or Vomiting,Fluid Status or Edema,Skin,Weight    Discharge Planning:     Too soon to determine     Argenis Estrella, [address], [place]  Contact: [phone]

## 2022-05-09 NOTE — ACP (ADVANCE CARE PLANNING)
Advance Care Planning     Advance Care Planning Activator (Inpatient)  Conversation Note      Date of ACP Conversation: 5/9/2022     Conversation Conducted with: Patient with Decision Making Capacity    ACP Activator: [facility] Decision Maker:     Current Designated Health Care Decision Maker:     Primary Decision Maker: Candice Acevess - [phone]    Today we documented desired Decision Maker(s), who is (are) NOT Legal Next of Lorenkatierakan Bustamante. ACP documents are required for decision maker authority. Care Preferences    Ventilation: \"If you were in your present state of health and suddenly became very ill and were unable to breathe on your own, what would your preference be about the use of a ventilator (breathing machine) if it were available to you? \"      Would the patient desire the use of ventilator (breathing machine)?: yes    \"If your health worsens and it becomes clear that your chance of recovery is unlikely, what would your preference be about the use of a ventilator (breathing machine) if it were available to you? \"     Would the patient desire the use of ventilator (breathing machine)?: Yes      Resuscitation  \"CPR works best to restart the heart when there is a sudden event, like a heart attack, in someone who is otherwise healthy. Unfortunately, CPR does not typically restart the heart for people who have serious health conditions or who are very sick. \"    \"In the event your heart stopped as a result of an underlying serious health condition, would you want attempts to be made to restart your heart (answer \"yes\" for attempt to resuscitate) or would you prefer a natural death (answer \"no\" for do not attempt to resuscitate)? \" yes       [] Yes   [x] No   Educated Patient / Consuelo Verde regarding differences between Advance Directives and portable DNR orders.     Length of ACP Conversation in minutes:  5    Conversation Outcomes:  [x] ACP discussion completed  [] Existing advance directive reviewed with patient; no changes to patient's previously recorded wishes  [] New Advance Directive completed  [] Portable Do Not Rescitate prepared for Provider review and signature  [] POLST/POST/MOLST/MOST prepared for Provider review and signature      Follow-up plan:    [] Schedule follow-up conversation to continue planning  [] Referred individual to Provider for additional questions/concerns   [] Advised patient/agent/surrogate to review completed ACP document and update if needed with changes in condition, patient preferences or care setting    [x] This note routed to one or more involved healthcare providers    Electronically signed by Francisca Pete on 5/9/2022 at 11:55 AM

## 2022-05-09 NOTE — ACP (ADVANCE CARE PLANNING)
Advance Care Planning     Advance Care Planning Inpatient Note  [facility] Department    Today's Date: 5/8/2022  Unit: [facility] 4N MED SURG    Received request from IDT Member. Upon review of chart and communication with care team, patient's decision making abilities are not in question. . Patient was/were present in the room during visit. Goals of ACP Conversation:  Discuss advance care planning documents    Health Care Decision Makers:     No healthcare decision makers have been documented. Click here to complete [place] including selection of the Healthcare Decision Maker Relationship (ie \"Primary\")  Summary:  No Decision Maker named by patient at this time    Advance Care Planning Documents (Patient Wishes):  Healthcare Power of /Advance Directive Appointment of Health Care Agent     Assessment:  AD/POA Ed completed.  Documents explanatory information and contact information left with patient, who will contact us (via RN, \"0\" ask for a )  if further questions or if they wish to complete.       Interventions:  Reviewed but did not complete ACP document    Care Preferences Communicated:   No    Outcomes/Plan:  ACP Discussion: Completed    Electronically signed by [facility] on 5/8/2022 at 9:48 PM

## 2022-05-09 NOTE — PROGRESS NOTES
Pt Name: Greg Oconnor  Medical Record Number: [id number]  Date of Birth 1965   Today's Date: 5/9/2022      Subjective:  Awake in bed, c/o dysuria and hematuria. ROS: Constitutional: No fever    Vitals:  Vitals:    05/08/22 2100 05/08/22 2213 05/09/22 0430 05/09/22 0858   BP: (!) 163/91  (!) 146/70 130/87   Pulse: 67  70 98   Resp: 17 17 18 14   Temp: 97.8 °F (36.6 °C)  98.1 °F (36.7 °C) 98 °F (36.7 °C)   TempSrc: Oral  Oral Oral   SpO2:   99% 97%   Weight:   135 lb 3.2 oz (61.3 kg)    Height:   5' 3\" (1.6 m)      I/O last 3 completed shifts:   In: 2747.6 [P.O.:360; I.V.:1475.7; IV Piggyback:911.9]  Out: 800 [Urine:800]    Exam:  General: Awake, oriented, no acute distress  Respiratory: Nonlabored breathing  Abdomen: Soft, non-tender, non-distended, no masses  : spontaneously voiding  Skin: Skin color, texture, turgor normal, no rashes or lesions  Neurologic: no gross deficits    CURRENT MEDICATIONS   Scheduled Meds:   lipase-protease-amylase  72,000 Units Oral 4x Daily WC    cefepime  1,000 mg IntraVENous Q12H    tamsulosin  0.4 mg Oral Daily    cevimeline  30 mg Oral TID    pantoprazole  40 mg Oral QAM AC    sodium chloride flush  5-40 mL IntraVENous 2 times per day    enoxaparin  40 mg SubCUTAneous Daily    DULoxetine  120 mg Oral Daily    estradiol  1 patch TransDERmal Once per day on Sun Wed    traZODone  200 mg Oral Nightly    traMADol  100 mg Oral Nightly    Vitamin D  1,000 Units Oral Daily     Continuous Infusions:   sodium chloride 100 mL/hr at 05/09/22 0432     PRN Meds:.phenazopyridine, diphenhydrAMINE, oxyCODONE, HYDROmorphone **OR** HYDROmorphone, hydrALAZINE, sodium chloride flush, sodium chloride, ondansetron **OR** ondansetron, polyethylene glycol, acetaminophen **OR** acetaminophen, melatonin, zolpidem    LABS     Recent Labs     05/07/22  1437 05/08/22  0722 05/09/22  0710   WBC 3.2* 2.8* 3.1*   HGB 12.4 10.7* 11.6*   HCT 38.0 31.8* 34.1*    112* 123*   * 139 137   K 4.6 4.1 4.0    105 102   CO2 22 23 25   BUN 11 9 9   CREATININE 1.2* 1.0 0.8   MG  --  1.60* 1.70*   PHOS  --  3.8 3.5   CALCIUM 9.4 8.7 9.3   AST 22 15 19   ALT 17 14 13   BILITOT <0.2 <0.2 <0.2       ASSESSMENT   1. Hospital day # 0  2. 57y. o. female s/p left ureteral stent insertion with Dr. Gely Briggs 5/8/22  3. Urine culture with citrobacter koseri  PLAN   1. Continue antibiotics, pyridium and pain medication PRN  2. Our office will contact her to arrange outpatient left ureteroscopy in 1-2 weeks.      Will sign off, call with questions    MONALISA Martinez CNP 5/9/2022 11:30 AM

## 2022-05-09 NOTE — PROGRESS NOTES
Hospitalist Progress Note      PCP: Cathy Ramos MD    Date of Admission: 5/7/2022    Subjective: went to OR and had cystoscopy and stent placed    Medications:  Reviewed    Infusion Medications    sodium chloride       Scheduled Medications    cefepime  1,000 mg IntraVENous Q12H    tamsulosin  0.4 mg Oral Daily    cevimeline  30 mg Oral TID    pantoprazole  40 mg Oral QAM AC    sodium chloride flush  5-40 mL IntraVENous 2 times per day    enoxaparin  40 mg SubCUTAneous Daily    lipase-protease-amylase  108,000 Units Oral TID WC    DULoxetine  120 mg Oral Daily    estradiol  1 patch TransDERmal Once per day on Sun Wed    traZODone  200 mg Oral Nightly    lipase-protease-amylase  108,000 Units Oral 4x daily    traMADol  100 mg Oral Nightly    Vitamin D  1,000 Units Oral Daily     PRN Meds: phenazopyridine, diphenhydrAMINE, oxyCODONE, HYDROmorphone **OR** HYDROmorphone, hydrALAZINE, sodium chloride flush, sodium chloride, ondansetron **OR** ondansetron, polyethylene glycol, acetaminophen **OR** acetaminophen, melatonin, zolpidem      Intake/Output Summary (Last 24 hours) at 5/9/2022 0059  Last data filed at 5/8/2022 2113  Gross per 24 hour   Intake 2747.58 ml   Output 800 ml   Net 1947.58 ml       Physical Exam Performed:    BP (!) 163/91   Pulse 67   Temp 97.8 °F (36.6 °C) (Oral)   Resp 17   Ht 5' 3\" (1.6 m)   Wt 135 lb 12.9 oz (61.6 kg)   SpO2 99%   BMI 24.06 kg/m²       Gen/overall appearance: Not in acute distress. Alert. Oriented X3  Head: Normocephalic, atraumatic  Eyes: EOMI, good acuity  ENT: Oral mucosa moist  Neck: No JVD, thyromegaly  CVS: Nml S1S2, no MRG, RRR  Pulm: Clear bilaterally. No crackles/wheezes  Gastrointestinal: Soft, NT/ND, +BS  Musculoskeletal: Left CVA tenderness on exam. No edema. Warm  Neuro: No focal deficit. Moves extremity spontaneously. Psychiatry: Appropriate affect. Not agitated. Skin: Warm, dry with normal turgor.  No rash  Capillary refill: Brisk,< 3 seconds   Peripheral Pulses: +2 palpable, equal bilaterally        Labs:   Recent Labs     05/07/22  1437 05/08/22 0722   WBC 3.2* 2.8*   HGB 12.4 10.7*   HCT 38.0 31.8*    112*     Recent Labs     05/07/22  1437 05/08/22 0722   * 139   K 4.6 4.1    105   CO2 22 23   BUN 11 9   CREATININE 1.2* 1.0   CALCIUM 9.4 8.7   PHOS  --  3.8     Recent Labs     05/07/22  1437 05/08/22 0722   AST 22 15   ALT 17 14   BILITOT <0.2 <0.2   ALKPHOS 49 36*     No results for input(s): INR in the last 72 hours. No results for input(s): Luna Kesha in the last 72 hours. Urinalysis:      Lab Results   Component Value Date    NITRU POSITIVE 05/07/2022    WBCUA 5 05/07/2022    BACTERIA 4+ 05/07/2022    RBCUA 4 05/07/2022    BLOODU TRACE-LYSED 05/07/2022    SPECGRAV >=1.030 05/07/2022    GLUCOSEU Negative 05/07/2022       Radiology:  CT ABDOMEN PELVIS WO CONTRAST Additional Contrast? None   Final Result   1. Indeterminate 3 mm calcification in the left upper pelvis, possibly a   distal ureteral calculus. No significant hydronephrosis. Additional   nonobstructive nephrolithiasis. 2. Status post cholecystectomy. Pneumobilia presumably related to previous   sphincterotomy. 3. Status post colectomy with right lower quadrant ileostomy. No acute bowel   pathology. 4. Two cystic lesions in the right adnexa with no imaging follow-up   indicated. Previous hysterectomy. Assessment/Plan:    Active Hospital Problems    Diagnosis     Acute cystitis with hematuria [N30.01]      Priority: Medium         1. Acute cystitis - started on Rocephin. Will continue Rocephin pending urine culture results. 2. Nephrolithiasis, possible ureterolithiasis. Started Flomax. urology consulted - s/p OR for cystoscopy and stent placement    3. Patient does not have acute kidney injury. Baseline creatinine is about 1.1 (see Care Everywhere). Current creatinine of 1.2     4.  Other comorbidities: history of osteoarthritis, CKD stage III, essential hypertension, migraine headaches, history of kidney stones.         Diet: ADULT DIET;  Regular  Code Status: Full Code    PT/OT Eval Status: ordered    Brian Gay MD

## 2022-05-09 NOTE — PLAN OF CARE
Nutrition Problem #1: Inadequate oral intake  Intervention: Food and/or Nutrient Delivery: Continue Current Diet,Start Oral Nutrition Supplement  Nutritional

## 2022-05-12 ENCOUNTER — HOSPITAL ENCOUNTER (OUTPATIENT)
Age: 57
Setting detail: OBSERVATION
Discharge: HOME | End: 2022-05-13
Attending: EMERGENCY MEDICINE | Admitting: FAMILY MEDICINE
Payer: MEDICARE

## 2022-05-12 ENCOUNTER — APPOINTMENT (OUTPATIENT)
Dept: CT IMAGING | Age: 57
End: 2022-05-12
Payer: MEDICARE

## 2022-05-12 DIAGNOSIS — R10.9 FLANK PAIN: ICD-10-CM

## 2022-05-12 DIAGNOSIS — R52 INTRACTABLE PAIN: Primary | ICD-10-CM

## 2022-05-12 DIAGNOSIS — R10.9 LEFT FLANK PAIN: ICD-10-CM

## 2022-05-12 LAB
BASOPHILS ABSOLUTE: 0 K/UL (ref 0–0.2)
BASOPHILS RELATIVE PERCENT: 0.5 %
BILIRUBIN URINE: NEGATIVE
BLOOD, URINE: ABNORMAL
CLARITY: ABNORMAL
COLOR: ABNORMAL
COMMENT UA: ABNORMAL
EOSINOPHILS ABSOLUTE: 0 K/UL (ref 0–0.6)
EOSINOPHILS RELATIVE PERCENT: 1 %
GLUCOSE URINE: NEGATIVE MG/DL
HCG QUALITATIVE: NEGATIVE
HCT VFR BLD CALC: 35.5 % (ref 36–48)
HEMOGLOBIN: 11.8 G/DL (ref 12–16)
KETONES, URINE: ABNORMAL MG/DL
LEUKOCYTE ESTERASE, URINE: ABNORMAL
LYMPHOCYTES ABSOLUTE: 1.1 K/UL (ref 1–5.1)
LYMPHOCYTES RELATIVE PERCENT: 32.9 %
MCH RBC QN AUTO: 29.7 PG (ref 26–34)
MCHC RBC AUTO-ENTMCNC: 33.3 G/DL (ref 31–36)
MCV RBC AUTO: 89.2 FL (ref 80–100)
MICROSCOPIC EXAMINATION: YES
MONOCYTES ABSOLUTE: 0.5 K/UL (ref 0–1.3)
MONOCYTES RELATIVE PERCENT: 13.1 %
MUCUS: ABNORMAL /LPF
NEUTROPHILS ABSOLUTE: 1.8 K/UL (ref 1.7–7.7)
NEUTROPHILS RELATIVE PERCENT: 52.5 %
NITRITE, URINE: NEGATIVE
PDW BLD-RTO: 14.1 % (ref 12.4–15.4)
PH UA: 6 (ref 5–8)
PLATELET # BLD: 134 K/UL (ref 135–450)
PMV BLD AUTO: 9.7 FL (ref 5–10.5)
PROTEIN UA: 300 MG/DL
RBC # BLD: 3.98 M/UL (ref 4–5.2)
RBC UA: ABNORMAL /HPF (ref 0–4)
SPECIFIC GRAVITY UA: 1.03 (ref 1–1.03)
URINE REFLEX TO CULTURE: ABNORMAL
URINE TYPE: ABNORMAL
UROBILINOGEN, URINE: 1 E.U./DL
WBC # BLD: 3.5 K/UL (ref 4–11)
WBC UA: ABNORMAL /HPF (ref 0–5)

## 2022-05-12 PROCEDURE — 80053 COMPREHEN METABOLIC PANEL: CPT

## 2022-05-12 PROCEDURE — 96374 THER/PROPH/DIAG INJ IV PUSH: CPT

## 2022-05-12 PROCEDURE — 99285 EMERGENCY DEPT VISIT HI MDM: CPT

## 2022-05-12 PROCEDURE — 96361 HYDRATE IV INFUSION ADD-ON: CPT

## 2022-05-12 PROCEDURE — 85025 COMPLETE CBC W/AUTO DIFF WBC: CPT

## 2022-05-12 PROCEDURE — 96375 TX/PRO/DX INJ NEW DRUG ADDON: CPT

## 2022-05-12 PROCEDURE — 6370000000 HC RX 637 (ALT 250 FOR IP): Performed by: NURSE PRACTITIONER

## 2022-05-12 PROCEDURE — 74176 CT ABD & PELVIS W/O CONTRAST: CPT

## 2022-05-12 PROCEDURE — 81001 URINALYSIS AUTO W/SCOPE: CPT

## 2022-05-12 PROCEDURE — 84703 CHORIONIC GONADOTROPIN ASSAY: CPT

## 2022-05-12 PROCEDURE — 83690 ASSAY OF LIPASE: CPT

## 2022-05-12 RX ORDER — FENTANYL CITRATE 50 UG/ML
50 INJECTION, SOLUTION INTRAMUSCULAR; INTRAVENOUS ONCE
Status: DISCONTINUED | OUTPATIENT
Start: 2022-05-12 | End: 2022-05-13 | Stop reason: HOSPADM

## 2022-05-12 RX ORDER — CYCLOBENZAPRINE HCL 10 MG
10 TABLET ORAL ONCE
Status: COMPLETED | OUTPATIENT
Start: 2022-05-12 | End: 2022-05-12

## 2022-05-12 RX ORDER — LIDOCAINE 4 G/G
1 PATCH TOPICAL ONCE
Status: COMPLETED | OUTPATIENT
Start: 2022-05-12 | End: 2022-05-13

## 2022-05-12 RX ORDER — ONDANSETRON 2 MG/ML
4 INJECTION INTRAMUSCULAR; INTRAVENOUS ONCE
Status: COMPLETED | OUTPATIENT
Start: 2022-05-12 | End: 2022-05-13

## 2022-05-12 RX ORDER — 0.9 % SODIUM CHLORIDE 0.9 %
1000 INTRAVENOUS SOLUTION INTRAVENOUS ONCE
Status: COMPLETED | OUTPATIENT
Start: 2022-05-12 | End: 2022-05-13

## 2022-05-12 RX ADMIN — CYCLOBENZAPRINE 10 MG: 10 TABLET, FILM COATED ORAL at 23:33

## 2022-05-12 ASSESSMENT — PAIN - FUNCTIONAL ASSESSMENT: PAIN_FUNCTIONAL_ASSESSMENT: 0-10

## 2022-05-12 ASSESSMENT — ENCOUNTER SYMPTOMS
SORE THROAT: 0
SHORTNESS OF BREATH: 0
BACK PAIN: 0
COUGH: 0
NAUSEA: 1
EYE PAIN: 0
VOMITING: 0
ANAL BLEEDING: 0
ABDOMINAL PAIN: 1

## 2022-05-12 ASSESSMENT — PAIN DESCRIPTION - PAIN TYPE: TYPE: ACUTE PAIN

## 2022-05-12 ASSESSMENT — PAIN DESCRIPTION - LOCATION: LOCATION: FLANK

## 2022-05-12 ASSESSMENT — PAIN DESCRIPTION - FREQUENCY: FREQUENCY: CONTINUOUS

## 2022-05-12 ASSESSMENT — PAIN SCALES - GENERAL: PAINLEVEL_OUTOF10: 10

## 2022-05-12 ASSESSMENT — PAIN DESCRIPTION - ORIENTATION: ORIENTATION: LEFT

## 2022-05-13 ENCOUNTER — APPOINTMENT (OUTPATIENT)
Dept: GENERAL RADIOLOGY | Age: 57
End: 2022-05-13
Payer: MEDICARE

## 2022-05-13 VITALS
BODY MASS INDEX: 23.24 KG/M2 | DIASTOLIC BLOOD PRESSURE: 71 MMHG | OXYGEN SATURATION: 97 % | HEIGHT: 63 IN | SYSTOLIC BLOOD PRESSURE: 114 MMHG | HEART RATE: 82 BPM | WEIGHT: 131.17 LBS | RESPIRATION RATE: 16 BRPM | TEMPERATURE: 98.1 F

## 2022-05-13 PROBLEM — R10.9 FLANK PAIN: Status: ACTIVE | Noted: 2022-05-13

## 2022-05-13 LAB
A/G RATIO: 1.5 (ref 1.1–2.2)
ALBUMIN SERPL-MCNC: 4.3 G/DL (ref 3.4–5)
ALP BLD-CCNC: 44 U/L (ref 40–129)
ALT SERPL-CCNC: 13 U/L (ref 10–40)
ANION GAP SERPL CALCULATED.3IONS-SCNC: 9 MMOL/L (ref 3–16)
AST SERPL-CCNC: 17 U/L (ref 15–37)
BILIRUB SERPL-MCNC: <0.2 MG/DL (ref 0–1)
BUN BLDV-MCNC: 16 MG/DL (ref 7–20)
CALCIUM SERPL-MCNC: 9.7 MG/DL (ref 8.3–10.6)
CHLORIDE BLD-SCNC: 101 MMOL/L (ref 99–110)
CO2: 24 MMOL/L (ref 21–32)
CREAT SERPL-MCNC: 1 MG/DL (ref 0.6–1.1)
GFR AFRICAN AMERICAN: >60
GFR NON-AFRICAN AMERICAN: 57
GLUCOSE BLD-MCNC: 78 MG/DL (ref 70–99)
LACTIC ACID: 1.2 MMOL/L (ref 0.4–2)
LIPASE: 28 U/L (ref 13–60)
POTASSIUM REFLEX MAGNESIUM: 4.2 MMOL/L (ref 3.5–5.1)
SODIUM BLD-SCNC: 134 MMOL/L (ref 136–145)
TOTAL PROTEIN: 7.2 G/DL (ref 6.4–8.2)

## 2022-05-13 PROCEDURE — 96372 THER/PROPH/DIAG INJ SC/IM: CPT

## 2022-05-13 PROCEDURE — 96374 THER/PROPH/DIAG INJ IV PUSH: CPT

## 2022-05-13 PROCEDURE — G0378 HOSPITAL OBSERVATION PER HR: HCPCS

## 2022-05-13 PROCEDURE — 36415 COLL VENOUS BLD VENIPUNCTURE: CPT

## 2022-05-13 PROCEDURE — 97116 GAIT TRAINING THERAPY: CPT

## 2022-05-13 PROCEDURE — 83605 ASSAY OF LACTIC ACID: CPT

## 2022-05-13 PROCEDURE — 2580000003 HC RX 258: Performed by: FAMILY MEDICINE

## 2022-05-13 PROCEDURE — 97161 PT EVAL LOW COMPLEX 20 MIN: CPT

## 2022-05-13 PROCEDURE — 6360000002 HC RX W HCPCS: Performed by: FAMILY MEDICINE

## 2022-05-13 PROCEDURE — 6360000002 HC RX W HCPCS: Performed by: NURSE PRACTITIONER

## 2022-05-13 PROCEDURE — 73110 X-RAY EXAM OF WRIST: CPT

## 2022-05-13 PROCEDURE — 2500000003 HC RX 250 WO HCPCS: Performed by: FAMILY MEDICINE

## 2022-05-13 PROCEDURE — 94760 N-INVAS EAR/PLS OXIMETRY 1: CPT

## 2022-05-13 PROCEDURE — 96375 TX/PRO/DX INJ NEW DRUG ADDON: CPT

## 2022-05-13 PROCEDURE — 2580000003 HC RX 258: Performed by: NURSE PRACTITIONER

## 2022-05-13 PROCEDURE — 6370000000 HC RX 637 (ALT 250 FOR IP): Performed by: FAMILY MEDICINE

## 2022-05-13 PROCEDURE — 96376 TX/PRO/DX INJ SAME DRUG ADON: CPT

## 2022-05-13 RX ORDER — HYDROXYCHLOROQUINE SULFATE 200 MG/1
300 TABLET, FILM COATED ORAL DAILY
Status: DISCONTINUED | OUTPATIENT
Start: 2022-05-13 | End: 2022-05-13 | Stop reason: HOSPADM

## 2022-05-13 RX ORDER — HYDRALAZINE HYDROCHLORIDE 20 MG/ML
10 INJECTION INTRAMUSCULAR; INTRAVENOUS EVERY 6 HOURS PRN
Status: DISCONTINUED | OUTPATIENT
Start: 2022-05-13 | End: 2022-05-13 | Stop reason: HOSPADM

## 2022-05-13 RX ORDER — DIPHENHYDRAMINE HCL 25 MG
25 CAPSULE ORAL EVERY 4 HOURS PRN
Qty: 18 CAPSULE | Refills: 0 | Status: SHIPPED | OUTPATIENT
Start: 2022-05-13 | End: 2022-05-13 | Stop reason: SDUPTHER

## 2022-05-13 RX ORDER — HYDROCODONE BITARTRATE AND ACETAMINOPHEN 5; 325 MG/1; MG/1
1 TABLET ORAL EVERY 6 HOURS PRN
Qty: 10 TABLET | Refills: 0 | Status: SHIPPED | OUTPATIENT
Start: 2022-05-13 | End: 2022-05-13

## 2022-05-13 RX ORDER — TRAZODONE HYDROCHLORIDE 50 MG/1
200 TABLET ORAL NIGHTLY
Status: DISCONTINUED | OUTPATIENT
Start: 2022-05-13 | End: 2022-05-13 | Stop reason: HOSPADM

## 2022-05-13 RX ORDER — CEFUROXIME AXETIL 250 MG/1
250 TABLET ORAL 2 TIMES DAILY
Qty: 3 TABLET | Refills: 0 | Status: SHIPPED | OUTPATIENT
Start: 2022-05-13 | End: 2022-05-13 | Stop reason: HOSPADM

## 2022-05-13 RX ORDER — HYDROCODONE BITARTRATE AND ACETAMINOPHEN 5; 325 MG/1; MG/1
1 TABLET ORAL EVERY 6 HOURS PRN
Qty: 10 TABLET | Refills: 0 | Status: SHIPPED | OUTPATIENT
Start: 2022-05-13 | End: 2022-05-13 | Stop reason: SDUPTHER

## 2022-05-13 RX ORDER — TAMSULOSIN HYDROCHLORIDE 0.4 MG/1
0.4 CAPSULE ORAL DAILY
Status: DISCONTINUED | OUTPATIENT
Start: 2022-05-13 | End: 2022-05-13 | Stop reason: HOSPADM

## 2022-05-13 RX ORDER — DIPHENHYDRAMINE HCL 25 MG
25 CAPSULE ORAL EVERY 4 HOURS PRN
Qty: 18 CAPSULE | Refills: 0 | Status: SHIPPED | OUTPATIENT
Start: 2022-05-13 | End: 2022-05-13

## 2022-05-13 RX ORDER — PHENAZOPYRIDINE HYDROCHLORIDE 100 MG/1
100 TABLET, FILM COATED ORAL 3 TIMES DAILY PRN
Status: DISCONTINUED | OUTPATIENT
Start: 2022-05-13 | End: 2022-05-13 | Stop reason: HOSPADM

## 2022-05-13 RX ORDER — DIPHENHYDRAMINE HYDROCHLORIDE 50 MG/ML
25 INJECTION INTRAMUSCULAR; INTRAVENOUS ONCE
Status: COMPLETED | OUTPATIENT
Start: 2022-05-13 | End: 2022-05-13

## 2022-05-13 RX ORDER — ENOXAPARIN SODIUM 100 MG/ML
40 INJECTION SUBCUTANEOUS DAILY
Status: DISCONTINUED | OUTPATIENT
Start: 2022-05-13 | End: 2022-05-13 | Stop reason: HOSPADM

## 2022-05-13 RX ORDER — ACETAMINOPHEN 650 MG/1
650 SUPPOSITORY RECTAL EVERY 6 HOURS PRN
Status: DISCONTINUED | OUTPATIENT
Start: 2022-05-13 | End: 2022-05-13 | Stop reason: HOSPADM

## 2022-05-13 RX ORDER — POTASSIUM CHLORIDE 7.45 MG/ML
10 INJECTION INTRAVENOUS PRN
Status: DISCONTINUED | OUTPATIENT
Start: 2022-05-13 | End: 2022-05-13 | Stop reason: HOSPADM

## 2022-05-13 RX ORDER — ONDANSETRON 4 MG/1
4-8 TABLET, ORALLY DISINTEGRATING ORAL EVERY 12 HOURS PRN
Qty: 12 TABLET | Refills: 0 | Status: SHIPPED | OUTPATIENT
Start: 2022-05-13 | End: 2022-05-13

## 2022-05-13 RX ORDER — PHENAZOPYRIDINE HYDROCHLORIDE 100 MG/1
100 TABLET, FILM COATED ORAL 3 TIMES DAILY PRN
Qty: 6 TABLET | Refills: 0 | Status: SHIPPED | OUTPATIENT
Start: 2022-05-13 | End: 2022-05-16

## 2022-05-13 RX ORDER — DIPHENHYDRAMINE HYDROCHLORIDE 50 MG/ML
50 INJECTION INTRAMUSCULAR; INTRAVENOUS EVERY 6 HOURS PRN
Status: DISCONTINUED | OUTPATIENT
Start: 2022-05-13 | End: 2022-05-13 | Stop reason: HOSPADM

## 2022-05-13 RX ORDER — FAMOTIDINE 10 MG/ML
20 INJECTION, SOLUTION INTRAVENOUS DAILY
Status: DISCONTINUED | OUTPATIENT
Start: 2022-05-13 | End: 2022-05-13 | Stop reason: HOSPADM

## 2022-05-13 RX ORDER — POTASSIUM CHLORIDE 20 MEQ/1
40 TABLET, EXTENDED RELEASE ORAL PRN
Status: DISCONTINUED | OUTPATIENT
Start: 2022-05-13 | End: 2022-05-13 | Stop reason: HOSPADM

## 2022-05-13 RX ORDER — FENTANYL CITRATE 50 UG/ML
25 INJECTION, SOLUTION INTRAMUSCULAR; INTRAVENOUS
Status: DISCONTINUED | OUTPATIENT
Start: 2022-05-13 | End: 2022-05-13 | Stop reason: HOSPADM

## 2022-05-13 RX ORDER — LABETALOL HYDROCHLORIDE 5 MG/ML
10 INJECTION, SOLUTION INTRAVENOUS EVERY 4 HOURS PRN
Status: DISCONTINUED | OUTPATIENT
Start: 2022-05-13 | End: 2022-05-13 | Stop reason: HOSPADM

## 2022-05-13 RX ORDER — CEVIMELINE HYDROCHLORIDE 30 MG/1
30 CAPSULE ORAL 3 TIMES DAILY
Status: DISCONTINUED | OUTPATIENT
Start: 2022-05-13 | End: 2022-05-13 | Stop reason: HOSPADM

## 2022-05-13 RX ORDER — ACETAMINOPHEN 325 MG/1
650 TABLET ORAL EVERY 6 HOURS PRN
Status: DISCONTINUED | OUTPATIENT
Start: 2022-05-13 | End: 2022-05-13 | Stop reason: HOSPADM

## 2022-05-13 RX ORDER — ONDANSETRON 4 MG/1
4 TABLET, ORALLY DISINTEGRATING ORAL EVERY 8 HOURS PRN
Status: DISCONTINUED | OUTPATIENT
Start: 2022-05-13 | End: 2022-05-13 | Stop reason: HOSPADM

## 2022-05-13 RX ORDER — CEFUROXIME AXETIL 250 MG/1
250 TABLET ORAL 2 TIMES DAILY
Status: DISCONTINUED | OUTPATIENT
Start: 2022-05-13 | End: 2022-05-13 | Stop reason: HOSPADM

## 2022-05-13 RX ORDER — PANTOPRAZOLE SODIUM 40 MG/1
40 TABLET, DELAYED RELEASE ORAL DAILY
Status: DISCONTINUED | OUTPATIENT
Start: 2022-05-13 | End: 2022-05-13 | Stop reason: HOSPADM

## 2022-05-13 RX ORDER — TRAMADOL HYDROCHLORIDE 50 MG/1
50 TABLET ORAL EVERY 4 HOURS PRN
Status: DISCONTINUED | OUTPATIENT
Start: 2022-05-13 | End: 2022-05-13 | Stop reason: HOSPADM

## 2022-05-13 RX ORDER — SODIUM CHLORIDE 0.9 % (FLUSH) 0.9 %
5-40 SYRINGE (ML) INJECTION EVERY 12 HOURS SCHEDULED
Status: DISCONTINUED | OUTPATIENT
Start: 2022-05-13 | End: 2022-05-13 | Stop reason: HOSPADM

## 2022-05-13 RX ORDER — DULOXETIN HYDROCHLORIDE 60 MG/1
120 CAPSULE, DELAYED RELEASE ORAL DAILY
Status: DISCONTINUED | OUTPATIENT
Start: 2022-05-13 | End: 2022-05-13 | Stop reason: HOSPADM

## 2022-05-13 RX ORDER — HYDROCODONE BITARTRATE AND ACETAMINOPHEN 7.5; 325 MG/1; MG/1
1 TABLET ORAL EVERY 6 HOURS PRN
Qty: 15 TABLET | Refills: 0 | Status: SHIPPED | OUTPATIENT
Start: 2022-05-13 | End: 2022-05-16

## 2022-05-13 RX ORDER — LANOLIN ALCOHOL/MO/W.PET/CERES
10 CREAM (GRAM) TOPICAL NIGHTLY
Status: DISCONTINUED | OUTPATIENT
Start: 2022-05-13 | End: 2022-05-13 | Stop reason: HOSPADM

## 2022-05-13 RX ORDER — ZOLPIDEM TARTRATE 5 MG/1
10 TABLET ORAL NIGHTLY
Status: DISCONTINUED | OUTPATIENT
Start: 2022-05-13 | End: 2022-05-13 | Stop reason: HOSPADM

## 2022-05-13 RX ORDER — POLYETHYLENE GLYCOL 3350 17 G/17G
17 POWDER, FOR SOLUTION ORAL DAILY PRN
Status: DISCONTINUED | OUTPATIENT
Start: 2022-05-13 | End: 2022-05-13 | Stop reason: HOSPADM

## 2022-05-13 RX ORDER — SODIUM CHLORIDE 0.9 % (FLUSH) 0.9 %
5-40 SYRINGE (ML) INJECTION PRN
Status: DISCONTINUED | OUTPATIENT
Start: 2022-05-13 | End: 2022-05-13 | Stop reason: HOSPADM

## 2022-05-13 RX ORDER — ONDANSETRON 2 MG/ML
4 INJECTION INTRAMUSCULAR; INTRAVENOUS EVERY 6 HOURS PRN
Status: DISCONTINUED | OUTPATIENT
Start: 2022-05-13 | End: 2022-05-13 | Stop reason: HOSPADM

## 2022-05-13 RX ORDER — SODIUM CHLORIDE 9 MG/ML
INJECTION, SOLUTION INTRAVENOUS PRN
Status: DISCONTINUED | OUTPATIENT
Start: 2022-05-13 | End: 2022-05-13 | Stop reason: HOSPADM

## 2022-05-13 RX ADMIN — CEFUROXIME AXETIL 250 MG: 250 TABLET ORAL at 08:57

## 2022-05-13 RX ADMIN — PANCRELIPASE 72000 UNITS: 24000; 76000; 120000 CAPSULE, DELAYED RELEASE PELLETS ORAL at 08:57

## 2022-05-13 RX ADMIN — FENTANYL CITRATE 25 MCG: 50 INJECTION, SOLUTION INTRAMUSCULAR; INTRAVENOUS at 06:30

## 2022-05-13 RX ADMIN — DIPHENHYDRAMINE HYDROCHLORIDE 25 MG: 50 INJECTION, SOLUTION INTRAMUSCULAR; INTRAVENOUS at 03:15

## 2022-05-13 RX ADMIN — DIPHENHYDRAMINE HYDROCHLORIDE 50 MG: 50 INJECTION, SOLUTION INTRAMUSCULAR; INTRAVENOUS at 15:23

## 2022-05-13 RX ADMIN — DULOXETINE HYDROCHLORIDE 120 MG: 60 CAPSULE, DELAYED RELEASE ORAL at 08:56

## 2022-05-13 RX ADMIN — Medication 10 ML: at 08:58

## 2022-05-13 RX ADMIN — FAMOTIDINE 20 MG: 10 INJECTION, SOLUTION INTRAVENOUS at 08:57

## 2022-05-13 RX ADMIN — SODIUM CHLORIDE 1000 ML: 9 INJECTION, SOLUTION INTRAVENOUS at 01:20

## 2022-05-13 RX ADMIN — ENOXAPARIN SODIUM 40 MG: 100 INJECTION SUBCUTANEOUS at 08:57

## 2022-05-13 RX ADMIN — PHENAZOPYRIDINE HYDROCHLORIDE 100 MG: 100 TABLET ORAL at 09:25

## 2022-05-13 RX ADMIN — HYDROXYCHLOROQUINE SULFATE 300 MG: 200 TABLET ORAL at 08:57

## 2022-05-13 RX ADMIN — ONDANSETRON 4 MG: 2 INJECTION INTRAMUSCULAR; INTRAVENOUS at 16:02

## 2022-05-13 RX ADMIN — ONDANSETRON 4 MG: 2 INJECTION INTRAMUSCULAR; INTRAVENOUS at 01:21

## 2022-05-13 RX ADMIN — DIPHENHYDRAMINE HYDROCHLORIDE 25 MG: 50 INJECTION, SOLUTION INTRAMUSCULAR; INTRAVENOUS at 01:22

## 2022-05-13 RX ADMIN — FENTANYL CITRATE 25 MCG: 50 INJECTION, SOLUTION INTRAMUSCULAR; INTRAVENOUS at 08:57

## 2022-05-13 RX ADMIN — PANTOPRAZOLE SODIUM 40 MG: 40 TABLET, DELAYED RELEASE ORAL at 08:57

## 2022-05-13 RX ADMIN — DIPHENHYDRAMINE HYDROCHLORIDE 50 MG: 50 INJECTION, SOLUTION INTRAMUSCULAR; INTRAVENOUS at 09:02

## 2022-05-13 RX ADMIN — FENTANYL CITRATE 25 MCG: 50 INJECTION, SOLUTION INTRAMUSCULAR; INTRAVENOUS at 12:13

## 2022-05-13 RX ADMIN — HYDROMORPHONE HYDROCHLORIDE 1 MG: 1 INJECTION, SOLUTION INTRAMUSCULAR; INTRAVENOUS; SUBCUTANEOUS at 01:22

## 2022-05-13 RX ADMIN — FENTANYL CITRATE 25 MCG: 50 INJECTION, SOLUTION INTRAMUSCULAR; INTRAVENOUS at 15:23

## 2022-05-13 RX ADMIN — PANCRELIPASE 72000 UNITS: 24000; 76000; 120000 CAPSULE, DELAYED RELEASE PELLETS ORAL at 12:08

## 2022-05-13 RX ADMIN — TAMSULOSIN HYDROCHLORIDE 0.4 MG: 0.4 CAPSULE ORAL at 08:57

## 2022-05-13 ASSESSMENT — PAIN SCALES - GENERAL
PAINLEVEL_OUTOF10: 8
PAINLEVEL_OUTOF10: 0
PAINLEVEL_OUTOF10: 0
PAINLEVEL_OUTOF10: 8
PAINLEVEL_OUTOF10: 10
PAINLEVEL_OUTOF10: 0
PAINLEVEL_OUTOF10: 0
PAINLEVEL_OUTOF10: 10
PAINLEVEL_OUTOF10: 10
PAINLEVEL_OUTOF10: 9

## 2022-05-13 ASSESSMENT — PAIN DESCRIPTION - PAIN TYPE
TYPE: ACUTE PAIN

## 2022-05-13 ASSESSMENT — PAIN DESCRIPTION - DESCRIPTORS
DESCRIPTORS: ACHING
DESCRIPTORS: ACHING
DESCRIPTORS: PATIENT UNABLE TO DESCRIBE
DESCRIPTORS: ACHING
DESCRIPTORS: ACHING
DESCRIPTORS: PATIENT UNABLE TO DESCRIBE

## 2022-05-13 ASSESSMENT — PAIN DESCRIPTION - ONSET
ONSET: ON-GOING

## 2022-05-13 ASSESSMENT — PAIN DESCRIPTION - LOCATION
LOCATION: FLANK

## 2022-05-13 ASSESSMENT — PAIN DESCRIPTION - DIRECTION
RADIATING_TOWARDS: BACK

## 2022-05-13 ASSESSMENT — PAIN - FUNCTIONAL ASSESSMENT
PAIN_FUNCTIONAL_ASSESSMENT: PREVENTS OR INTERFERES SOME ACTIVE ACTIVITIES AND ADLS
PAIN_FUNCTIONAL_ASSESSMENT: ACTIVITIES ARE NOT PREVENTED
PAIN_FUNCTIONAL_ASSESSMENT: PREVENTS OR INTERFERES SOME ACTIVE ACTIVITIES AND ADLS
PAIN_FUNCTIONAL_ASSESSMENT: ACTIVITIES ARE NOT PREVENTED

## 2022-05-13 ASSESSMENT — PAIN DESCRIPTION - ORIENTATION
ORIENTATION: LEFT

## 2022-05-13 ASSESSMENT — PAIN DESCRIPTION - FREQUENCY
FREQUENCY: CONTINUOUS

## 2022-05-13 ASSESSMENT — PAIN SCALES - WONG BAKER
WONGBAKER_NUMERICALRESPONSE: 0
WONGBAKER_NUMERICALRESPONSE: 0

## 2022-05-13 NOTE — PROGRESS NOTES
Patient is alert & oriented x4, SBA/UAL, 2/4 bed rails up, bed in lowest position, fall precautions in place, call light within reach. Morning medications given. PRN pain medication and benadryl given. Pt complaining of 8/10 left flank pain. Morning assessment complete. Will cont to monitor and reassess.   Electronically signed by Consuelo Dong RN on 5/13/2022 at 9:22 AM

## 2022-05-13 NOTE — PROGRESS NOTES
Comprehensive Nutrition Assessment    Type and Reason for Visit:  Initial,Positive Nutrition Screen    1. Continue on current diet  2. Start Ensure Clear tid  3. Monitor meal and supplement intake     Malnutrition Assessment:  Malnutrition Status: At risk for malnutrition (Comment) (05/13/22 1137)    Context:  Chronic Illness     Findings of the 6 clinical characteristics of malnutrition:  Energy Intake:  Mild decrease in energy intake (Comment)  Weight Loss:  No significant weight loss     Body Fat Loss:  No significant body fat loss     Muscle Mass Loss:  No significant muscle mass loss    Fluid Accumulation:  No significant fluid accumulation     Strength:  Not Performed    Nutrition Assessment:    Pt has a +3 nutrition screen score for poor po and wt loss. Pt admitted for flank pain related to 3 mm L ureterolithiasis. PMH includes HTN, GERD, pacreatitis and colostomy. Pt  eats 6 small meals per day. Made suggested choices for 6 meals here. Pt does select fruit on most trays and is willing to trial berry Ensure Clear to have between meals. Pt has dairy intolerance and has not been able to tolerate Ensure. Pt  wt record in EMR indicates only a 4# wt loss which is 3% in 3 months which is not significant. Whe reports 15# in 7 months which is 10% and also not significant. Pt expresed concern if she is getting complete nutrition at home. Attempted to review her home eating patterns. Suggested a supplement might be helpful to continue. Nutrition Related Findings:    BM 5/12, no edema Wound Type: None       Current Nutrition Intake & Therapies:    Average Meal Intake: 51-75%  Average Supplements Intake: None Ordered  ADULT DIET;  Regular; Low Fat/Low Chol/High Fiber/CAIN  ADULT ORAL NUTRITION SUPPLEMENT; Breakfast, Lunch, Dinner; Clear Liquid Oral Supplement    Anthropometric Measures:  Height: 5' 3\" (160 cm)  Ideal Body Weight (IBW): 115 lbs (52 kg)       Current Body Weight: 131 lb 2.8 oz (59.5 kg), 114.1 % IBW. Weight Source: Standing Scale  Current BMI (kg/m2): 23.2  Usual Body Weight: 46 lb (20.9 kg)  % Weight Change (Calculated): 185. 2  Weight Adjustment For: No Adjustment                 BMI Categories: Normal Weight (BMI 18.5-24. 9)    Estimated Daily Nutrient Needs:     Weight Used for Energy Requirements: Current  Energy (kcal/day): 2885-0671 ( 25-30 x CBW 60)     Protein (g/day): 60-72 (1.0-1.2 x CBW 60)     Fluid (ml/day): per provider    Nutrition Diagnosis:   · Unintended weight loss related to inadequate protein-energy intake as evidenced by poor intake prior to admission      Nutrition Interventions:   Food and/or Nutrient Delivery: Continue Current Diet,Start Oral Nutrition Supplement  Nutrition Education/Counseling: No recommendation at this time  Coordination of Nutrition Care: Continue to monitor while inpatient       Goals:     Goals: PO intake 50% or greater       Nutrition Monitoring and Evaluation:      Food/Nutrient Intake Outcomes: Food and Nutrient Intake,Supplement Intake  Physical Signs/Symptoms Outcomes: Biochemical Data,Nutrition Focused Physical Findings,Weight    Discharge Planning:    No discharge needs at this time     Jose C Coreas, [address] [place] [address],   Contact: [phone]

## 2022-05-13 NOTE — PROGRESS NOTES
Data- discharge order received, pt verbalized agreement to discharge, disposition to previous residence, no needs for HHC/DME. Action- discharge instructions prepared and given to pt, pt verbalized understanding. Medication information packet given r/t NEW and/or CHANGED prescriptions emphasizing name/purpose/side effects, pt verbalized understanding. Discharge instruction summary: Diet- as tolerated, Activity- as tolerated, Primary Care Physician as follows: Khloe Mcdonald -[phone] f/u appointment with Dr. Simran Juarez on monday, prescription medications filled and delivered to room. Response- Pt belongings gathered, IV removed. Disposition is home (no HHC/DME needs), transported with belongigns, taken to lobby via w/c once boyfriend arrives to [facility] [place], no complications.    Electronically signed by Shaina Rose RN on 5/13/2022 at 4:29 PM

## 2022-05-13 NOTE — ED PROVIDER NOTES
I PERSONALLY SAW THE PATIENT AND PERFORMED A SUBSTANTIVE PORTION OF THE VISIT INCLUDING ALL ASPECTS OF THE MEDICAL DECISION MAKING PROCESS. [address]      Pt Name: Lyn Briscoe  MRN: [id number]  Delorisgfmartha 1965  Date of evaluation: 2022  Provider: Salomón Concepcion MD    CHIEF COMPLAINT       Chief Complaint   Patient presents with    Flank Pain     left, meds not helping       HISTORY OF PRESENT ILLNESS    Lyn Briscoe is a 65 y.o. female who presents to the emergency department with flank pain. Patient recently diagnosed with UTI and kidney stone. Had a stent placed. Endorses worsening pain. 10-10 achy in nature. No other associated symptoms. Nursing Notes were reviewed. Including nursing noted for FM, Surgical History, Past Medical History, Social History, vitals, and allergies; agree with all. REVIEW OF SYSTEMS       Review of Systems    Except as noted above the remainder of the review of systems was reviewed and negative.      PAST MEDICAL HISTORY     Past Medical History:   Diagnosis Date    Arthritis     Frequency of urination     GERD (gastroesophageal reflux disease)     Hypertension     has not taken meds for 2 weeks    Migraines, neuralgic     Pancreatitis        SURGICAL HISTORY       Past Surgical History:   Procedure Laterality Date    BUNIONECTOMY       SECTION      CHOLECYSTECTOMY      COLECTOMY      CYSTOSCOPY Left 2022    CYSTOSCOPY URETERAL STENT INSERTION performed by Shereen Green MD at [address]    ERCP  2011     STENT REMOVAL, 5 FR 5 SM STENT PLACEMENT    ERCP      HYSTERECTOMY      JOINT REPLACEMENT      left knee    TUBAL LIGATION      UPPER GASTROINTESTINAL ENDOSCOPY  13    WITH BIOPSY AND BOTOX INJECTION    UPPER GASTROINTESTINAL ENDOSCOPY  3/25/14    with botox injection    UPPER GASTROINTESTINAL ENDOSCOPY  10-24-14 Esophagogastroduodenoscopy with Botulinum toxin injection of the pylorus    UPPER GASTROINTESTINAL ENDOSCOPY  3/24/15    WITH BOTOX INJECTION       CURRENT MEDICATIONS       Previous Medications    ACETAMINOPHEN (TYLENOL) 500 MG TABLET    Take 1,000 mg by mouth in the morning and at bedtime    ACYCLOVIR (ZOVIRAX) 400 MG TABLET    Take 400 mg by mouth daily     CEFUROXIME (CEFTIN) 250 MG TABLET    Take 1 tablet by mouth 2 times daily for 7 days    CEVIMELINE (EVOXAC) 30 MG CAPSULE    Take 30 mg by mouth 3 times daily    DULOXETINE (CYMBALTA) 60 MG EXTENDED RELEASE CAPSULE    Take 120 mg by mouth daily     ESTRADIOL (VIVELLE) 0.0375 MG/24HR    Place 1 patch onto the skin Twice a Week Wednesdays and Sundays    HYDROXYCHLOROQUINE (PLAQUENIL) 200 MG TABLET    Take 300 mg by mouth daily    LIFITEGRAST (XIIDRA) 5 % SOLN    Place 1 drop into both eyes daily    LIPASE-PROTEASE-AMYLASE (CREON) 37806-974757 UNITS CPEP DELAYED RELEASE CAPSULE    Take 2 capsules by mouth 4 times daily     MELATONIN 10 MG TABS    Take 10 mg by mouth nightly     PANTOPRAZOLE (PROTONIX) 40 MG TABLET    Take 1 tablet by mouth daily    PHENAZOPYRIDINE (PYRIDIUM) 100 MG TABLET    Take 1 tablet by mouth 3 times daily as needed for Pain    TAMSULOSIN (FLOMAX) 0.4 MG CAPSULE    Take 1 capsule by mouth daily    TRAMADOL (ULTRAM) 50 MG TABLET    Take 2 tablets by mouth every 8 hours as needed for Pain for up to 5 days. TRAZODONE (DESYREL) 100 MG TABLET    Take 200 mg by mouth nightly     VITAMIN D (CHOLECALCIFEROL) 25 MCG (1000 UT) TABS TABLET    Take 1,000 Units by mouth daily    ZOLPIDEM (AMBIEN) 10 MG TABLET    Take 10 mg by mouth nightly.        ALLERGIES     Morphine and Vicodin [hydrocodone-acetaminophen]    FAMILY HISTORY        Family History   Problem Relation Age of Onset    Diabetes Mother     Cancer Mother     Arthritis Mother     High Blood Pressure Mother     High Blood Pressure Sister     Cancer Maternal Aunt     Diabetes Maternal Grandmother     Anesth Problems Neg Hx     Malig Hyperten Neg Hx     Hypotension Neg Hx     Malig Hypertherm Neg Hx     Pseudochol. Deficiency Neg Hx        SOCIAL HISTORY       Social History     Socioeconomic History    Marital status: Single     Spouse name: None    Number of children: None    Years of education: None    Highest education level: None   Occupational History    None   Tobacco Use    Smoking status: Never Smoker    Smokeless tobacco: Never Used   Substance and Sexual Activity    Alcohol use: No    Drug use: No    Sexual activity: None   Other Topics Concern    None   Social History Narrative    None     Social Determinants of Health     Financial Resource Strain:     Difficulty of Paying Living Expenses: Not on file   Food Insecurity:     Worried About Running Out of Food in the Last Year: Not on file    Abimael of Food in the Last Year: Not on file   Transportation Needs:     Lack of Transportation (Medical): Not on file    Lack of Transportation (Non-Medical):  Not on file   Physical Activity:     Days of Exercise per Week: Not on file    Minutes of Exercise per Session: Not on file   Stress:     Feeling of Stress : Not on file   Social Connections:     Frequency of Communication with Friends and Family: Not on file    Frequency of Social Gatherings with Friends and Family: Not on file    Attends Baptism Services: Not on file    Active Member of [address] [place] [address] [employer] or Organizations: Not on file    Attends Club or Organization Meetings: Not on file    Marital Status: Not on file   Intimate Partner Violence:     Fear of Current or Ex-Partner: Not on file    Emotionally Abused: Not on file    Physically Abused: Not on file    Sexually Abused: Not on file   Housing Stability:     Unable to Pay for Housing in the Last Year: Not on file    Number of [place] in the Last Year: Not on file    Unstable Housing in the Last Year: Not on file       [address]       ED Triage Vitals [05/12/22 2035]   BP Temp Temp Source Pulse Resp SpO2 Height Weight   (!) 119/93 97.4 °F (36.3 °C) Temporal 95 18 97 % 5' 3\" (1.6 m) 132 lb 7.9 oz (60.1 kg)       Physical Exam  Vitals and nursing note reviewed. Constitutional:       General: She is not in acute distress. Appearance: She is well-developed. She is not ill-appearing, toxic-appearing or diaphoretic. HENT:      Head: Normocephalic and atraumatic. Right Ear: External ear normal.      Left Ear: External ear normal.   Eyes:      General:         Right eye: No discharge. Left eye: No discharge. Conjunctiva/sclera: Conjunctivae normal.      Pupils: Pupils are equal, round, and reactive to light. Cardiovascular:      Rate and Rhythm: Normal rate and regular rhythm. Heart sounds: No murmur heard. Pulmonary:      Effort: Pulmonary effort is normal. No respiratory distress. Breath sounds: Normal breath sounds. No wheezing or rales. Abdominal:      General: Bowel sounds are normal. There is no distension. Palpations: Abdomen is soft. There is no mass. Tenderness: There is no abdominal tenderness. There is no guarding or rebound. Genitourinary:     Comments: Deferred  Musculoskeletal:         General: No deformity. Normal range of motion. Cervical back: Normal range of motion and neck supple. Skin:     General: Skin is warm. Findings: No erythema or rash. Neurological:      Mental Status: She is alert and oriented to person, place, and time. She is not disoriented. Cranial Nerves: No cranial nerve deficit. Motor: No atrophy or abnormal muscle tone. Coordination: Coordination normal.   Psychiatric:         Behavior: Behavior normal.         Thought Content:  Thought content normal.         DIAGNOSTIC RESULTS     RADIOLOGY:   Non-plain film images such as CT, Ultrasoundand MRI are read by the radiologist. Plain radiographic images are visualized and preliminarily interpreted by the emergency physician with the below findings:    Impression   Punctate nonobstructing renal stones bilaterally.       Interval placement of a left double-J ureteral stent.       No ureteral stone on either side.       Multiple fluid-filled small bowel loops, nonspecific finding which can be   seen with acute enteritis. ED BEDSIDE ULTRASOUND:   Performed by ED Physician - none    LABS:  Labs Reviewed   CBC WITH AUTO DIFFERENTIAL - Abnormal; Notable for the following components:       Result Value    WBC 3.5 (*)     RBC 3.98 (*)     Hemoglobin 11.8 (*)     Hematocrit 35.5 (*)     Platelets 897 (*)     All other components within normal limits   COMPREHENSIVE METABOLIC PANEL W/ REFLEX TO MG FOR LOW K - Abnormal; Notable for the following components:    Sodium 134 (*)     GFR Non- 57 (*)     All other components within normal limits   URINALYSIS WITH REFLEX TO CULTURE - Abnormal; Notable for the following components:    Color, UA ORANGE (*)     Clarity, UA CLOUDY (*)     Ketones, Urine TRACE (*)     Blood, Urine LARGE (*)     Leukocyte Esterase, Urine MODERATE (*)     All other components within normal limits   MICROSCOPIC URINALYSIS - Abnormal; Notable for the following components:    Mucus, UA Rare (*)     RBC, UA 5-10 (*)     All other components within normal limits   HCG, SERUM, QUALITATIVE   LIPASE   LACTIC ACID       All other labs were withinnormal range or not returned as of this dictation. EMERGENCY DEPARTMENT COURSE and DIFFERENTIAL DIAGNOSIS/MDM:     PMH, Surgical Hx, FH, Social Hx reviewed by myself (ETOH usage, Tobacco usage, Drug usage reviewed by myself, no pertinent Hx)- No Pertinent Hx     Old records were reviewed by me     MDM 71-year-old with flank pain status post ureteral stent placement. Reassuring work-up. Pain not well controlled. Admission for pain control.   Labs-   Diagnostic findings  Medications  Consults  Disposition admission    I PERSONALLY SAW THE PATIENT AND PERFORMED A SUBSTANTIVE PORTION OF THE VISIT INCLUDING ALL ASPECTS OF THE MEDICAL DECISION MAKING PROCESS. CRITICAL CARE TIME   Total Critical Caretime was 21 minutes, excluding separately reportable procedures. There was a high probability of clinically significant/life threatening deterioration in the patient's condition which required my urgent intervention. CRITICAL CARE  I personally saw the patient and independently provided 21 minutes of non-concurrent critical care out of the total shared critical care time provided. This excludes seperately billable procedures. Critical care time was provided for patient as above that required close evaluation and/or intervention with concern for potential patient decompensation. PROCEDURES:  Unlessotherwise noted below, none    FINAL IMPRESSION      1. Left flank pain          DISPOSITION/PLAN   DISPOSITION Admitted 05/13/2022 03:30:39 AM    PATIENT REFERRED TO:  No follow-up provider specified. DISCHARGE MEDICATIONS:  New Prescriptions    DIPHENHYDRAMINE (BENADRYL) 25 MG CAPSULE    Take 1 capsule by mouth every 4 hours as needed for Itching (associated with hydrocodone)    HYDROCODONE-ACETAMINOPHEN (NORCO) 5-325 MG PER TABLET    Take 1 tablet by mouth every 6 hours as needed for Pain for up to 3 days. Intended supply: 3 days. Take lowest dose possible to manage pain    ONDANSETRON (ZOFRAN ODT) 4 MG DISINTEGRATING TABLET    Take 1-2 tablets by mouth every 12 hours as needed for Nausea May Sub regular tablet (non-ODT) if insurance does not cover ODT.           (Please note that portions ofthis note were completed with a voice recognition program.  Efforts were made to edit the dictations but occasionally words are mis-transcribed.)    Khalida Tsang MD(electronically signed)  Attending Emergency Physician        Khalida Tsang MD  05/13/22 1076

## 2022-05-13 NOTE — ED PROVIDER NOTES
[address]  [address] [address]  Dept: [phone]  Loc: [facility]        I have seen and evaluated this patient with my supervising physician, Dr. Mala Sánchez. CHIEF COMPLAINT    Chief Complaint   Patient presents with    Flank Pain     left, meds not helping       HPI    Greg Oconnor is a 65 y.o. female who presents with exacerbation of left flank left flank pain status post she was admitted here with infected ureteral stone from 5/7-5/9/2022. She states that she had a ureter stent placed and was discharged home with Ultram, Pyridium, Flomax, and Ceftin. Pain medication is not helping and she is experiencing nausea but states that she was not prescribed any antiemetics. Onset of exacerbation has been for the past 2-3 days/since discharge. The duration has been constant since the onset. The patient has had associated nausea and sweats. The pain severity is 10/10. The quality is \"stabbing\". There are no aggravating or alleviating factors. Denies vomiting, dizziness, presyncope, fever, chills, abdominal pain, diarrhea, urinary symptoms/retention, other concerns. Of note, patient also complains of right wrist/hand pain status post IV insertion during previous admission. She does have an appointment with urology on 5/23/2022. Review of Systems   Constitutional: Positive for diaphoresis. Negative for chills, fatigue and fever. HENT: Negative for congestion and sore throat. Eyes: Negative for pain and visual disturbance. Respiratory: Negative for cough and shortness of breath. Cardiovascular: Negative for chest pain and leg swelling. Gastrointestinal: Positive for abdominal pain and nausea. Negative for anal bleeding and vomiting. Genitourinary: Positive for flank pain.  Negative for difficulty urinating, dysuria, frequency, urgency, vaginal bleeding, vaginal discharge and vaginal pain. Musculoskeletal: Negative for back pain and neck pain. Skin: Negative for rash and wound. Neurological: Negative for dizziness and light-headedness. Hematological: Negative. Psychiatric/Behavioral: Negative. PAST MEDICAL & SURGICAL HISTORY    Past Medical History:   Diagnosis Date    Arthritis     Frequency of urination     GERD (gastroesophageal reflux disease)     Hypertension     has not taken meds for 2 weeks    Migraines, neuralgic     Pancreatitis      Past Surgical History:   Procedure Laterality Date    BUNIONECTOMY       SECTION      CHOLECYSTECTOMY      COLECTOMY      CYSTOSCOPY Left 2022    CYSTOSCOPY URETERAL STENT INSERTION performed by Shereen Green MD at [address] [place] [address] [facility]      STENT, MULTIPLE    [facility]  2011     STENT REMOVAL, 5 FR 5 SM STENT PLACEMENT    ERCP      HYSTERECTOMY      JOINT REPLACEMENT      left knee    TUBAL LIGATION      UPPER GASTROINTESTINAL ENDOSCOPY  13    WITH BIOPSY AND BOTOX INJECTION    UPPER GASTROINTESTINAL ENDOSCOPY  3/25/14    with botox injection    UPPER GASTROINTESTINAL ENDOSCOPY  10-24-14    Esophagogastroduodenoscopy with Botulinum toxin injection of the pylorus    UPPER GASTROINTESTINAL ENDOSCOPY  3/24/15    WITH BOTOX INJECTION       CURRENT MEDICATIONS  (may include discharge medications prescribed in the ED)  Current Outpatient Rx   Medication Sig Dispense Refill    cefUROXime (CEFTIN) 250 MG tablet Take 1 tablet by mouth 2 times daily for 7 days 14 tablet 0    tamsulosin (FLOMAX) 0.4 MG capsule Take 1 capsule by mouth daily 30 capsule 3    phenazopyridine (PYRIDIUM) 100 MG tablet Take 1 tablet by mouth 3 times daily as needed for Pain 15 tablet 0    traMADol (ULTRAM) 50 MG tablet Take 2 tablets by mouth every 8 hours as needed for Pain for up to 5 days.  30 tablet 0    acetaminophen (TYLENOL) 500 MG tablet Take 1,000 mg by mouth in the morning and at bedtime      lipase-protease-amylase (CREON) 25945-839233 units CPEP delayed release capsule Take 2 capsules by mouth 4 times daily       estradiol (VIVELLE) 0.0375 MG/24HR Place 1 patch onto the skin Twice a Week Wednesdays and Sundays      hydroxychloroquine (PLAQUENIL) 200 MG tablet Take 300 mg by mouth daily      Lifitegrast (XIIDRA) 5 % SOLN Place 1 drop into both eyes daily      ondansetron (ZOFRAN-ODT) 4 MG disintegrating tablet Take 4 mg by mouth as needed for Nausea or Vomiting      vitamin D (CHOLECALCIFEROL) 25 MCG (1000 UT) TABS tablet Take 1,000 Units by mouth daily      cevimeline (EVOXAC) 30 MG capsule Take 30 mg by mouth 3 times daily      DULoxetine (CYMBALTA) 60 MG extended release capsule Take 120 mg by mouth daily       pantoprazole (PROTONIX) 40 MG tablet Take 1 tablet by mouth daily      zolpidem (AMBIEN) 10 MG tablet Take 10 mg by mouth nightly.       traZODone (DESYREL) 100 MG tablet Take 200 mg by mouth nightly       Melatonin 10 MG TABS Take 10 mg by mouth nightly       acyclovir (ZOVIRAX) 400 MG tablet Take 400 mg by mouth daily          ALLERGIES    Allergies   Allergen Reactions    Morphine      HEADACHE    Vicodin [Hydrocodone-Acetaminophen] Other (See Comments)     headache       SOCIAL HISTORY    Social History     Socioeconomic History    Marital status: Single     Spouse name: None    Number of children: None    Years of education: None    Highest education level: None   Occupational History    None   Tobacco Use    Smoking status: Never Smoker    Smokeless tobacco: Never Used   Substance and Sexual Activity    Alcohol use: No    Drug use: No    Sexual activity: None   Other Topics Concern    None   Social History Narrative    None     Social Determinants of Health     Financial Resource Strain:     Difficulty of Paying Living Expenses: Not on file   Food Insecurity:     Worried About Running Out of Food in the Last Year: Not on file    Abimael of Food in the Last Year: Not on file   Transportation Needs:     Lack of Transportation (Medical): Not on file    Lack of Transportation (Non-Medical):  Not on file   Physical Activity:     Days of Exercise per Week: Not on file    Minutes of Exercise per Session: Not on file   Stress:     Feeling of Stress : Not on file   Social Connections:     Frequency of Communication with Friends and Family: Not on file    Frequency of Social Gatherings with Friends and Family: Not on file    Attends Gnosticism Services: Not on file    Active Member of [address] [place] [address] [employer] or Organizations: Not on file    Attends Club or Organization Meetings: Not on file    Marital Status: Not on file   Intimate Partner Violence:     Fear of Current or Ex-Partner: Not on file    Emotionally Abused: Not on file    Physically Abused: Not on file    Sexually Abused: Not on file   Housing Stability:     Unable to Pay for Housing in the Last Year: Not on file    Number of [place] in the Last Year: Not on file    Unstable Housing in the Last Year: Not on file       PHYSICAL EXAM    VITAL SIGNS: BP (!) 119/93   Pulse 95   Temp 97.4 °F (36.3 °C) (Temporal)   Resp 18   Ht 5' 3\" (1.6 m)   Wt 132 lb 7.9 oz (60.1 kg)   SpO2 97%   BMI 23.47 kg/m²    Constitutional:  Well developed, well nourished, patient appears uncomfortable  HENT:  Atraumatic, moist mucus membranes  Neck: No JVD, supple   Respiratory:  Lungs clear to auscultation bilaterally, no respiratory distress  Cardiovascular:  regular rate and rhythm,  no murmurs, rubs, or gallops.  + S1-S2  GI:  Soft, no abdominal tenderness, no pulsatile masses  Musculoskeletal:  No edema, no acute deformities  Back: no soft tissue swelling, + left CVA tenderness   Vascular: DP pulses 2+ and equal bilaterally  Integument:  Skin is warm and dry   Neurologic: Awake, alert and oriented, motor 5/5 bilaterally, patellar reflexes are 2+ and equal bilaterally, sensation to light touch is intact in the groin and lower extremities bilaterally    RADIOLOGY/PROCEDURES    CT ABDOMEN PELVIS WO CONTRAST Additional Contrast? None    Result Date: 5/13/2022  EXAMINATION: CT OF THE ABDOMEN AND PELVIS WITHOUT CONTRAST 5/12/2022 11:56 pm TECHNIQUE: CT of the abdomen and pelvis was performed without the administration of intravenous contrast. Multiplanar reformatted images are provided for review. Automated exposure control, iterative reconstruction, and/or weight based adjustment of the mA/kV was utilized to reduce the radiation dose to as low as reasonably achievable. COMPARISON: 05/07/2022 HISTORY: ORDERING SYSTEM PROVIDED HISTORY: Flank pain TECHNOLOGIST PROVIDED HISTORY: Reason for exam:->Flank pain Additional Contrast?->None Decision Support Exception - unselect if not a suspected or confirmed emergency medical condition->Emergency Medical Condition (MA) Reason for Exam: flank pain hx stones FINDINGS: Lower Chest: Lung bases clear. Organs: Status post cholecystectomy. Interval decrease in pneumobilia. Unremarkable spleen, pancreas, and adrenals on this unenhanced study. A nonobstructing 0.3 cm stone in the right kidney. A punctate nonobstructing stone in the left kidney. Interval placement of a left double-J ureteral stent. No radiopaque stone in the ureters. [facility] GI/Bowel: Prior near total colectomy and right-sided ileostomy. Bowel loops nonobstructed. Multiple fluid-filled small bowel loops are seen, nonspecific. Pelvis: Small cystic areas in the right adnexa redemonstrated and unchanged, measuring up to 2.7 cm. Prior hysterectomy. No radiopaque stone in the urinary bladder. Peritoneum/Retroperitoneum: No free air or free fluid. No adenopathy. No abdominal aortic aneurysm. Bones/Soft Tissues: Multilevel lumbar spondylosis with minimal dextroscoliosis     Punctate nonobstructing renal stones bilaterally. Interval placement of a left double-J ureteral stent. No ureteral stone on either side.  Multiple fluid-filled small bowel Ref Range    WBC 3.5 (L) 4.0 - 11.0 K/uL    RBC 3.98 (L) 4.00 - 5.20 M/uL    Hemoglobin 11.8 (L) 12.0 - 16.0 g/dL    Hematocrit 35.5 (L) 36.0 - 48.0 %    MCV 89.2 80.0 - 100.0 fL    MCH 29.7 26.0 - 34.0 pg    MCHC 33.3 31.0 - 36.0 g/dL    RDW 14.1 12.4 - 15.4 %    Platelets 617 (L) 710 - 450 K/uL    MPV 9.7 5.0 - 10.5 fL    Neutrophils % 52.5 %    Lymphocytes % 32.9 %    Monocytes % 13.1 %    Eosinophils % 1.0 %    Basophils % 0.5 %    Neutrophils Absolute 1.8 1.7 - 7.7 K/uL    Lymphocytes Absolute 1.1 1.0 - 5.1 K/uL    Monocytes Absolute 0.5 0.0 - 1.3 K/uL    Eosinophils Absolute 0.0 0.0 - 0.6 K/uL    Basophils Absolute 0.0 0.0 - 0.2 K/uL   Comprehensive Metabolic Panel w/ Reflex to MG   Result Value Ref Range    Sodium 134 (L) 136 - 145 mmol/L    Potassium reflex Magnesium 4.2 3.5 - 5.1 mmol/L    Chloride 101 99 - 110 mmol/L    CO2 24 21 - 32 mmol/L    Anion Gap 9 3 - 16    Glucose 78 70 - 99 mg/dL    BUN 16 7 - 20 mg/dL    CREATININE 1.0 0.6 - 1.1 mg/dL    GFR Non- 57 (A) >60    GFR African American >60 >60    Calcium 9.7 8.3 - 10.6 mg/dL    Total Protein 7.2 6.4 - 8.2 g/dL    Albumin 4.3 3.4 - 5.0 g/dL    Albumin/Globulin Ratio 1.5 1.1 - 2.2    Total Bilirubin <0.2 0.0 - 1.0 mg/dL    Alkaline Phosphatase 44 40 - 129 U/L    ALT 13 10 - 40 U/L    AST 17 15 - 37 U/L   Lipase   Result Value Ref Range    Lipase 28.0 13.0 - 60.0 U/L   Urinalysis with Reflex to Culture    Specimen: Urine, clean catch   Result Value Ref Range    Color, UA ORANGE (A) Straw/Yellow    Clarity, UA CLOUDY (A) Clear    Glucose, Ur Negative Negative mg/dL    Bilirubin Urine Negative Negative    Ketones, Urine TRACE (A) Negative mg/dL    Specific Gravity, UA 1.031 1.005 - 1.030    Blood, Urine LARGE (A) Negative    pH, UA 6.0 5.0 - 8.0    Protein,  Negative mg/dL    Urobilinogen, Urine 1.0 <2.0 E.U./dL    Nitrite, Urine Negative Negative    Leukocyte Esterase, Urine MODERATE (A) Negative    Microscopic Examination YES Urine Type NotGiven     Urine Reflex to Culture Not Indicated    Lactic Acid   Result Value Ref Range    Lactic Acid 1.2 0.4 - 2.0 mmol/L   Microscopic Urinalysis   Result Value Ref Range    Mucus, UA Rare (A) None Seen /LPF    WBC, UA None seen 0 - 5 /HPF    RBC, UA 5-10 (A) 0 - 4 /HPF    Urinalysis Comments see below      CT ABDOMEN PELVIS WO CONTRAST Additional Contrast? None    Result Date: 5/13/2022  EXAMINATION: CT OF THE ABDOMEN AND PELVIS WITHOUT CONTRAST 5/12/2022 11:56 pm TECHNIQUE: CT of the abdomen and pelvis was performed without the administration of intravenous contrast. Multiplanar reformatted images are provided for review. Automated exposure control, iterative reconstruction, and/or weight based adjustment of the mA/kV was utilized to reduce the radiation dose to as low as reasonably achievable. COMPARISON: 05/07/2022 HISTORY: ORDERING SYSTEM PROVIDED HISTORY: Flank pain TECHNOLOGIST PROVIDED HISTORY: Reason for exam:->Flank pain Additional Contrast?->None Decision Support Exception - unselect if not a suspected or confirmed emergency medical condition->Emergency Medical Condition (MA) Reason for Exam: flank pain hx stones FINDINGS: Lower Chest: Lung bases clear. Organs: Status post cholecystectomy. Interval decrease in pneumobilia. Unremarkable spleen, pancreas, and adrenals on this unenhanced study. A nonobstructing 0.3 cm stone in the right kidney. A punctate nonobstructing stone in the left kidney. Interval placement of a left double-J ureteral stent. No radiopaque stone in the ureters. [facility]  GI/Bowel: Prior near total colectomy and right-sided ileostomy. Bowel loops nonobstructed. Multiple fluid-filled small bowel loops are seen, nonspecific. Pelvis: Small cystic areas in the right adnexa redemonstrated and unchanged, measuring up to 2.7 cm. Prior hysterectomy. No radiopaque stone in the urinary bladder. Peritoneum/Retroperitoneum: No free air or free fluid. No adenopathy.   No abdominal aortic aneurysm. Bones/Soft Tissues: Multilevel lumbar spondylosis with minimal dextroscoliosis     Punctate nonobstructing renal stones bilaterally. Interval placement of a left double-J ureteral stent. No ureteral stone on either side. Multiple fluid-filled small bowel loops, nonspecific finding which can be seen with acute enteritis. Work-up reveals:  No elevation in lactic acid or lipase, very mild hyponatremia at 134 mmol/L, normal renal and LFTs; CBC with leukopenia with a WBC of 3.5, anemia with RBC reduced at 3.98, hemoglobin reduced at 11.8, hematocrit reduced at 35.5% as well as platelet count reduced at 134; serum pregnancy negative; microscopic urinalysis shows mucus rare, RBC 5-10 without WBCs; color orange, clarity cloudy, ketones trace, blood large, leukocyte esterase moderate; CT A/P as above. Patient was reevaluated thereafter and pain was not well controlled. At this time I consulted the hospitalist for admission for intractable pain secondary to left lower ureteral stone with ureteral stent placement. Imaging identifies punctate nonobstructing renal calculus bilaterally. Left double-J ureteral stent in place. No ureteral stones on either side. Given that we have tried the patient on multiple medications for pain without improvement patient will require admission to the hospital for further evaluation and treatment with potential evaluation by urology in a.m..    I consulted the hospitalist-Dr. Masha Porter for admission for intractable left flank pain despite the fact that patient's work-up identifies no acute findings which would not be causing the severe discomfort. FINAL IMPRESSION    1. Left flank pain    2. Intractable pain-left flank    Disposition:  Admitted      Patient will be admitted to hospital for further evaluation and treatment.        Hospitalist: Dr. Landy Armstrong      Discussed patients HPI, ED work-up, results, treatment, and response with my attending physician

## 2022-05-13 NOTE — CARE COORDINATION
05/13/22 0909   Readmission Assessment   Number of Days since last admission? 1-7 days   Previous Disposition Home with Family   Who is being Interviewed Patient   What was the patient's/caregiver's perception as to why they think they needed to return back to the hospital? Other (Comment)  (left flank left flank pain; had uretel stent placed last admit)   Did you visit your Primary Care Physician after you left the hospital, before you returned this time? No   Why weren't you able to visit your PCP? Did not have an appointment   Did you see a specialist, such as Cardiac, Pulmonary, Orthopedic Physician, etc. after you left the hospital? No   Who advised the patient to return to the hospital? Self-referral   Does the patient report anything that got in the way of taking their medications? No   In our efforts to provide the best possible care to you and others like you, can you think of anything that we could have done to help you after you left the hospital the first time, so that you might not have needed to return so soon?  Other (Comment)  (left flank left flank pain; had uretel stent placed last admit)

## 2022-05-13 NOTE — CONSULTS
Alcohol use: No    Drug use: No    Sexual activity: Not on file   Other Topics Concern    Not on file   Social History Narrative    Not on file     Social Determinants of Health     Financial Resource Strain:     Difficulty of Paying Living Expenses: Not on file   Food Insecurity:     Worried About Running Out of Food in the Last Year: Not on file    Abimael of Food in the Last Year: Not on file   Transportation Needs:     Lack of Transportation (Medical): Not on file    Lack of Transportation (Non-Medical): Not on file   Physical Activity:     Days of Exercise per Week: Not on file    Minutes of Exercise per Session: Not on file   Stress:     Feeling of Stress : Not on file   Social Connections:     Frequency of Communication with Friends and Family: Not on file    Frequency of Social Gatherings with Friends and Family: Not on file    Attends Caodaism Services: Not on file    Active Member of [address] [place] [address] [employer] or Organizations: Not on file    Attends Club or Organization Meetings: Not on file    Marital Status: Not on file   Intimate Partner Violence:     Fear of Current or Ex-Partner: Not on file    Emotionally Abused: Not on file    Physically Abused: Not on file    Sexually Abused: Not on file   Housing Stability:     Unable to Pay for Housing in the Last Year: Not on file    Number of [place] in the Last Year: Not on file    Unstable Housing in the Last Year: Not on file       Family History:  Family History   Problem Relation Age of Onset    Diabetes Mother     Cancer Mother     Arthritis Mother     High Blood Pressure Mother     High Blood Pressure Sister     Cancer Maternal Aunt     Diabetes Maternal Grandmother     Anesth Problems Neg Hx     Malig Hyperten Neg Hx     Hypotension Neg Hx     Malig Hypertherm Neg Hx     Pseudochol.  Deficiency Neg Hx        Meds:   Current Facility-Administered Medications: traMADol (ULTRAM) tablet 50 mg, 50 mg, Oral, Q4H PRN  sodium chloride flush 0.9 % injection 5-40 mL, 5-40 mL, IntraVENous, 2 times per day  sodium chloride flush 0.9 % injection 5-40 mL, 5-40 mL, IntraVENous, PRN  0.9 % sodium chloride infusion, , IntraVENous, PRN  potassium chloride (KLOR-CON M) extended release tablet 40 mEq, 40 mEq, Oral, PRN **OR** potassium bicarb-citric acid (EFFER-K) effervescent tablet 40 mEq, 40 mEq, Oral, PRN **OR** potassium chloride 10 mEq/100 mL IVPB (Peripheral Line), 10 mEq, IntraVENous, PRN  enoxaparin (LOVENOX) injection 40 mg, 40 mg, SubCUTAneous, Daily  ondansetron (ZOFRAN-ODT) disintegrating tablet 4 mg, 4 mg, Oral, Q8H PRN **OR** ondansetron (ZOFRAN) injection 4 mg, 4 mg, IntraVENous, Q6H PRN  polyethylene glycol (GLYCOLAX) packet 17 g, 17 g, Oral, Daily PRN  famotidine (PEPCID) injection 20 mg, 20 mg, IntraVENous, Daily  acetaminophen (TYLENOL) tablet 650 mg, 650 mg, Oral, Q6H PRN **OR** acetaminophen (TYLENOL) suppository 650 mg, 650 mg, Rectal, Q6H PRN  DULoxetine (CYMBALTA) extended release capsule 120 mg, 120 mg, Oral, Daily  hydroxychloroquine (PLAQUENIL) tablet 300 mg, 300 mg, Oral, Daily  lipase-protease-amylase (CREON) delayed release capsule 72,000 Units, 72,000 Units, Oral, 4x daily  melatonin tablet 10.5 mg, 10.5 mg, Oral, Nightly  pantoprazole (PROTONIX) tablet 40 mg, 40 mg, Oral, Daily  phenazopyridine (PYRIDIUM) tablet 100 mg, 100 mg, Oral, TID PRN  tamsulosin (FLOMAX) capsule 0.4 mg, 0.4 mg, Oral, Daily  traZODone (DESYREL) tablet 200 mg, 200 mg, Oral, Nightly  zolpidem (AMBIEN) tablet 10 mg, 10 mg, Oral, Nightly  Lifitegrast 5 % SOLN 1 drop, 1 drop, Both Eyes, Daily  cevimeline (EVOXAC) capsule 30 mg, 30 mg, Oral, TID  hydrALAZINE (APRESOLINE) injection 10 mg, 10 mg, IntraVENous, Q6H PRN  labetalol (NORMODYNE;TRANDATE) injection 10 mg, 10 mg, IntraVENous, Q4H PRN  cefUROXime (CEFTIN) tablet 250 mg, 250 mg, Oral, BID  diphenhydrAMINE (BENADRYL) injection 50 mg, 50 mg, IntraVENous, Q6H PRN  fentaNYL (SUBLIMAZE) injection 25 mcg, 25 mcg, IntraVENous, Q2H PRN  fentaNYL (SUBLIMAZE) injection 50 mcg, 50 mcg, IntraVENous, Once    Vitals:  /68   Pulse 67   Temp 97.8 °F (36.6 °C) (Oral)   Resp 16   Ht 5' 3\" (1.6 m)   Wt 131 lb 2.8 oz (59.5 kg)   SpO2 98%   BMI 23.24 kg/m²     Intake/Output Summary (Last 24 hours) at 5/13/2022 1142  Last data filed at 5/13/2022 0919  Gross per 24 hour   Intake 360 ml   Output 500 ml   Net -140 ml       Review of Systems:  10 Systems were reviewed and negative except as in HPI      Physical Exam:  General Appearance: Alert and oriented, cooperative, no distress, appears stated age  Head: Normocephalic, without obvious abnormality, atraumatic  Back: no CVA tenderness  Lungs: respirations unlabored, no wheezing  Heart: Regular rate and rhythm, no lower extremity edema noted  Abdomen: Soft, non-tender, non-distended, no masses  Skin: Skin color, texture, turgor normal, no rashes or lesions  Neurologic: no gross deficits  Female :   Bladder is non tender  No CVA tenderness  Spontaneously voiding  Pelvic Exam Not Indicated    Labs:  CBC   Lab Results   Component Value Date    WBC 3.5 05/12/2022    RBC 3.98 05/12/2022    HGB 11.8 05/12/2022    HCT 35.5 05/12/2022    MCV 89.2 05/12/2022    MCH 29.7 05/12/2022    MCHC 33.3 05/12/2022    RDW 14.1 05/12/2022     05/12/2022    MPV 9.7 05/12/2022     BMP   Lab Results   Component Value Date     05/12/2022    K 4.2 05/12/2022     05/12/2022    CO2 24 05/12/2022    BUN 16 05/12/2022    CREATININE 1.0 05/12/2022    GLUCOSE 78 05/12/2022    CALCIUM 9.7 05/12/2022       Urinalysis:   Lab Results   Component Value Date    COLORU ORANGE 05/12/2022    GLUCOSEU Negative 05/12/2022    BLOODU LARGE 05/12/2022    NITRU Negative 05/12/2022    LEUKOCYTESUR MODERATE 05/12/2022       Imaging: Pertinent images and radiologist's report were reviewed independently  CT abdomen/pelvis 5/12/22  Impression   Punctate nonobstructing renal stones bilaterally.     Interval placement of a left double-J ureteral stent.       No ureteral stone on either side.       Multiple fluid-filled small bowel loops, nonspecific finding which can be   seen with acute enteritis. Impression/Plan:   - 59y.o. female with flank pain. Left ureteral stent in appropriate position.  - No acute  intervention, flank pain can be expected with stent in place. Flomax, pyridum and pain medications as needed  - She is scheduled for 5/23/22 for ureteroscopy, will try to move this up to Monday outpatient with Dr. Safia Liu. She can otherwise be [facility]. Our surgery schedulers will reach out to her.      Leanne Carlos, MONALISA - CNP 2/17/[id number]:52 AM

## 2022-05-13 NOTE — CARE COORDINATION
INITIAL CASE MANAGEMENT ASSESSMENT     Reviewed chart, met with patient to assess possible discharge needs. Explained Case Management role/services. SW interviewed patient alone at bedside today.      Living Situation: Patient reports that she resides in a single family home with her friend/caregiver and her family. They have one dog. Prior to medical admission patient reports that she had no trouble getting in and out of the property.     ADLs: Prior to medical admission patient reports that she received assistance with cooking, shopping and housekeeping. She stated that she doesn't anticipate any needs at discharge.      DME: Prior to medical admission patient reports that she had a cane. She stated that she doesn't anticipate any needs at discharge.     PT/OT Recs: Home with assistance PRN.     Active Services: Prior to medical admission patient had no active services in place. She stated that she doesn't anticipate any needs at discharge.      Transportation: Prior to medical admission patient reports that she was an active . She stated that her boyfriend will likely transport her home at discharge.      Medications: Patient receives medications from [facility] on [facility]. At this time she stated that he had no issues with access or affordability.      Jose Luis Montgomery -[phone] (AFI) [facility] [employer] [phone] (fax number). Patient reports that her last appointment with this provider was a couple of months ago.     HD/PD: N/A     PLAN/COMMENTS:   1) Urology clearance. 2) Discharge to home with family.     SW provided contact information for patient or family to call with any questions. SW will follow and assist as needed.     Respectfully submitted,     ELIJAH Dupree  [facility]   [phone]    Electronically signed by ELIJAH Sutherland on 5/13/2022 at 3:11 PM

## 2022-05-13 NOTE — PROGRESS NOTES
Pt is c/o uncontrolled pain in left flank. Perfect serve sent to Dr. Pepper Hurtado.  \"Pt just arrived from the ER for uncontrolled flank pain. She is c/o severe itching from the [address] [place] [address]. She was given 2 doses of Benadryl in the ER and she states they were not effective. She is c/o 10/10 pain in left flank. She is refusing the Dilaudid, Tramadol that are ordered. She is requesting Fentanyl to be given. She says if she can't have Fentanyl she would rather have the Morphine that she has listed as an allergy instead of Dialudid. I'm really not sure what to do for her. Any recommendations? \"  New orders placed.

## 2022-05-13 NOTE — H&P
[facility] ([facility]) Internal Medicine History and Physical    Chief Complaint   Patient presents with    Flank Pain     left, meds not helping       HPI:  65 y.o. yo female who  has a past medical history of Arthritis, Frequency of urination, GERD (gastroesophageal reflux disease), Hypertension, Migraines, neuralgic, and Pancreatitis. presents to [facility] emergency department complaining of left-sided flank pain that began 7 days ago. At that time it was located in her left lower back and flank associated with dysuria radiating around to her left groin and pelvis. She was seen in the emergency department was found to have a 3 mm left ureterolithiasis, she underwent stenting with urology and was treated with ceftriaxone and discharged on cefuroxime. She was discharged with tramadol but she is back tonight because the tramadol is not helping. She is experiencing severe stabbing left flank pain associated with nausea, nonradiating, no other inciting events, exacerbating or relieving factors, her symptoms are constant. CT in the emergency department shows no additional ureterolithiasis. She has a stent in the left ureter that is in good position and several nonobstructing nephrolithiases in bilateral kidneys. Despite fentanyl, hydromorphone and antiemetics she is not feeling any better so ER is requesting admission for pain control. In the ED her urinalysis did not appear to be infected. We will admit her to observation. ROS:  A complete 14 point review of systems performed and is negative except as noted above. Past medical, surgical, family hx reviewed.     Past Medical History:  Past Medical History:   Diagnosis Date    Arthritis     Frequency of urination     GERD (gastroesophageal reflux disease)     Hypertension     has not taken meds for 2 weeks    Migraines, neuralgic     Pancreatitis        Surgical History:  Social History     Socioeconomic History    Marital status: Single Spouse name: Not on file    Number of children: Not on file    Years of education: Not on file    Highest education level: Not on file   Occupational History    Not on file   Tobacco Use    Smoking status: Never Smoker    Smokeless tobacco: Never Used   Substance and Sexual Activity    Alcohol use: No    Drug use: No    Sexual activity: Not on file   Other Topics Concern    Not on file   Social History Narrative    Not on file     Social Determinants of Health     Financial Resource Strain:     Difficulty of Paying Living Expenses: Not on file   Food Insecurity:     Worried About Running Out of Food in the Last Year: Not on file    Abimael of Food in the Last Year: Not on file   Transportation Needs:     Lack of Transportation (Medical): Not on file    Lack of Transportation (Non-Medical):  Not on file   Physical Activity:     Days of Exercise per Week: Not on file    Minutes of Exercise per Session: Not on file   Stress:     Feeling of Stress : Not on file   Social Connections:     Frequency of Communication with Friends and Family: Not on file    Frequency of Social Gatherings with Friends and Family: Not on file    Attends Jewish Services: Not on file    Active Member of Clubs or Organizations: Not on file    Attends Club or Organization Meetings: Not on file    Marital Status: Not on file   Intimate Partner Violence:     Fear of Current or Ex-Partner: Not on file    Emotionally Abused: Not on file    Physically Abused: Not on file    Sexually Abused: Not on file   Housing Stability:     Unable to Pay for Housing in the Last Year: Not on file    Number of [place] in the Last Year: Not on file    Unstable Housing in the Last Year: Not on file       Family History:  Family History   Problem Relation Age of Onset    Diabetes Mother     Cancer Mother     Arthritis Mother     High Blood Pressure Mother     High Blood Pressure Sister     Cancer Maternal Aunt     Diabetes Maternal Grandmother     Anesth Problems Neg Hx     Malig Hyperten Neg Hx     Hypotension Neg Hx     Malig Hypertherm Neg Hx     Pseudochol. Deficiency Neg Hx        Home Meds:  No current facility-administered medications on file prior to encounter. Current Outpatient Medications on File Prior to Encounter   Medication Sig Dispense Refill    cefUROXime (CEFTIN) 250 MG tablet Take 1 tablet by mouth 2 times daily for 7 days 14 tablet 0    tamsulosin (FLOMAX) 0.4 MG capsule Take 1 capsule by mouth daily 30 capsule 3    phenazopyridine (PYRIDIUM) 100 MG tablet Take 1 tablet by mouth 3 times daily as needed for Pain 15 tablet 0    traMADol (ULTRAM) 50 MG tablet Take 2 tablets by mouth every 8 hours as needed for Pain for up to 5 days. 30 tablet 0    acetaminophen (TYLENOL) 500 MG tablet Take 1,000 mg by mouth in the morning and at bedtime      lipase-protease-amylase (CREON) 91197-991957 units CPEP delayed release capsule Take 2 capsules by mouth 4 times daily       estradiol (VIVELLE) 0.0375 MG/24HR Place 1 patch onto the skin Twice a Week Wednesdays and Sundays      hydroxychloroquine (PLAQUENIL) 200 MG tablet Take 300 mg by mouth daily      Lifitegrast (XIIDRA) 5 % SOLN Place 1 drop into both eyes daily      vitamin D (CHOLECALCIFEROL) 25 MCG (1000 UT) TABS tablet Take 1,000 Units by mouth daily      cevimeline (EVOXAC) 30 MG capsule Take 30 mg by mouth 3 times daily      DULoxetine (CYMBALTA) 60 MG extended release capsule Take 120 mg by mouth daily       pantoprazole (PROTONIX) 40 MG tablet Take 1 tablet by mouth daily      zolpidem (AMBIEN) 10 MG tablet Take 10 mg by mouth nightly.       traZODone (DESYREL) 100 MG tablet Take 200 mg by mouth nightly       Melatonin 10 MG TABS Take 10 mg by mouth nightly       acyclovir (ZOVIRAX) 400 MG tablet Take 400 mg by mouth daily          Physical Exam:  Vitals:    05/12/22 2035   BP: (!) 119/93   Pulse: 95   Resp: 18   Temp: 97.4 °F (36.3 °C) SpO2: 97%         Based on new provisions and guidance offered in setting of COVID 19 outbreak and in order to preserve personal protective equipment in accordance with the flexibilities announced by CMS limited examination is performed. General: Well appearing, not diaphoretic, moderate acute distress  Head: Normocephalic, atraumatic  Eyes: No ocular discharge, no scleral injection, no icterus  Ears: Normal external auricles  Nose: No rhinorrhea, no epistaxis  Throat: Not examined, no difficulty swallowing  Pulm: No apparent respiratory distress  Cardio: Normal rate, regular rhythm, no peripheral edema  GI: non-distended  Neuro: Alert and oriented, cn2-12 grossly intact, strength 5/5 bilaterally. Psych: Cooperative  Skin: no jaundice    Assessment and Plan:     #Flank pain  #S/P ureteral stent placement  #Nephrolithiasis  #HTN    -Flank pain, refractory to pain meds  -CT scan repeated in the ER, no residual ureterolithiasis, no obstruction, no hydronephrosis, stent in good position  -As needed analgesics, synthetic opioids for severe pain  -Urology consulted for any additional recommendations  -UA not concerning for UTI. Continue ABX as prescribed to finish course  -As needed antihypertensives  - Continue to monitor electrolytes and replete as appropriate  - Pt high risk for vte, lmwh for vte ppx.  - Continue medications for chronic problems    I have personally reviewed pertinent laboratory, ekg and imaging results, as well as documentation in the patient's emr. Laboratory and imaging orders per the above plan have been addressed, see orders above.  Patient's case, assessment and plan have been discussed on this date with ED provider

## 2022-05-13 NOTE — PROGRESS NOTES
[facility] Medicine Progress Note      Admit Date: 5/12/2022       CC: F/U for flank pain     HPI:  65 y.o. yo female who  has a past medical history of Arthritis, Frequency of urination, GERD (gastroesophageal reflux disease), Hypertension, Migraines, neuralgic, and Pancreatitis. presents to [facility] emergency department complaining of left-sided flank pain that began 7 days ago. At that time it was located in her left lower back and flank associated with dysuria radiating around to her left groin and pelvis. She was seen in the emergency department was found to have a 3 mm left ureterolithiasis, she underwent stenting with urology and was treated with ceftriaxone and discharged on cefuroxime. She was discharged with tramadol but she is back tonight because the tramadol is not helping. She is experiencing severe stabbing left flank pain associated with nausea, nonradiating, no other inciting events, exacerbating or relieving factors, her symptoms are constant. CT in the emergency department shows no additional ureterolithiasis. She has a stent in the left ureter that is in good position and several nonobstructing nephrolithiases in bilateral kidneys. Despite fentanyl, hydromorphone and antiemetics she is not feeling any better so ER is requesting admission for pain control. In the ED her urinalysis did not appear to be infected. We will admit her to observation. I ran an OARRS report on the patient and found  Prescriptions for tramadol filled 5/11 #30 x5 days. Interval History/Subjective: Urology has been consulted for patient. Has allergy listed to vicodin and morphine itching and headache. After speaking with patient, she states she can tolerate norco but requesting 7.5mg since \"she has so many health issues, the 5mg doesn't help much. \"  Advised do not take ultram with norco. If she is able to get this filled here, she can take this Rx home with her and f/u urology on 120 ml   Output --   Net 120 ml        General appearance:   No apparent distress, appears stated age. Cooperative. HEENT:  Normocephalic, atraumatic. PERRLA. EOMi. Conjunctivae/corneas clear, no icterus, non-injected. Neck: Supple, with full range of motion. No jugular venous distention. Trachea midline. Respiratory:  Normal respiratory effort. Clear to auscultation, bilaterally without Rales/Wheezes/Rhonchi. Cardiovascular:  Regular rate and rhythm without murmurs, rubs or gallops. Abdomen: Soft, non-tender, non-distended, without rebound or guarding. Normal bowel sounds. Musculoskeletal:  No clubbing, cyanosis or edema bilaterally. Full range of motion without deformity. Skin: Skin color, texture, turgor normal.  No rashes or lesions. Neurologic:  Neurovascularly intact without any focal sensory/motor deficits. Cranial nerves: II-XII intact, grossly intact. No facial asymmetry, tongue midline. Psychiatric:  Alert and oriented, thought content appropriate  Capillary Refill: Brisk,< 3 seconds   Peripheral Pulses: +2 palpable, equal bilaterally       LABS:    Lab Results   Component Value Date    WBC 3.5 (L) 05/12/2022    HGB 11.8 (L) 05/12/2022    HCT 35.5 (L) 05/12/2022    MCV 89.2 05/12/2022     (L) 05/12/2022    LYMPHOPCT 32.9 05/12/2022    RBC 3.98 (L) 05/12/2022    MCH 29.7 05/12/2022    MCHC 33.3 05/12/2022    RDW 14.1 05/12/2022       Lab Results   Component Value Date    CREATININE 1.0 05/12/2022    BUN 16 05/12/2022     (L) 05/12/2022    K 4.2 05/12/2022     05/12/2022    CO2 24 05/12/2022       Lab Results   Component Value Date    MG 1.70 05/09/2022       Lab Results   Component Value Date    ALT 13 05/12/2022    AST 17 05/12/2022    ALKPHOS 44 05/12/2022    BILITOT <0.2 05/12/2022        No flowsheet data found.     No results found for: LABA1C    Imaging:  CT ABDOMEN PELVIS WO CONTRAST Additional Contrast? None   Final Result   Punctate nonobstructing renal stones bilaterally. Interval placement of a left double-J ureteral stent. No ureteral stone on either side. Multiple fluid-filled small bowel loops, nonspecific finding which can be   seen with acute enteritis. Scheduled and prn Medications:    Scheduled Meds:   sodium chloride flush  5-40 mL IntraVENous 2 times per day    enoxaparin  40 mg SubCUTAneous Daily    famotidine (PEPCID) injection  20 mg IntraVENous Daily    DULoxetine  120 mg Oral Daily    hydroxychloroquine  300 mg Oral Daily    lipase-protease-amylase  72,000 Units Oral 4x daily    melatonin  10.5 mg Oral Nightly    pantoprazole  40 mg Oral Daily    tamsulosin  0.4 mg Oral Daily    traZODone  200 mg Oral Nightly    zolpidem  10 mg Oral Nightly    Lifitegrast  1 drop Both Eyes Daily    cevimeline  30 mg Oral TID    cefUROXime  250 mg Oral BID    fentanNYL  50 mcg IntraVENous Once    lidocaine  1 patch TransDERmal Once     Continuous Infusions:   sodium chloride       PRN Meds:.traMADol, sodium chloride flush, sodium chloride, potassium chloride **OR** potassium alternative oral replacement **OR** potassium chloride, ondansetron **OR** ondansetron, polyethylene glycol, acetaminophen **OR** acetaminophen, phenazopyridine, hydrALAZINE, labetalol, diphenhydrAMINE, fentanNYL    Assessment & Plan:          #Flank pain  #S/P ureteral stent placement  #Nephrolithiasis  #HTN     -Flank pain, refractory to pain meds  -CT scan repeated in the ER, no residual ureterolithiasis, no obstruction, no hydronephrosis, stent in good position  -As needed analgesics, synthetic opioids for severe pain  -Urology consulted for any additional recommendations  -UA not concerning for UTI.   Continue ABX as prescribed to finish course  -As needed antihypertensives  - Continue to monitor electrolytes and replete as appropriate  - Pt high risk for vte, lmwh for vte ppx.  - Continue medications for chronic problems    Continue current regimen/therapies. Monitor. Adjust medical regimen as appropriate. Body mass index is 23.24 kg/m². The patient and / or the family were informed of the results of any tests, a time was given to answer questions, a plan was proposed and they agreed with plan. DVT ppx: lovenox  GI ppx: pepcid    Diet: ADULT DIET;  Regular; Low Fat/Low Chol/High Fiber/CAIN    Consults:  IP CONSULT TO HOSPITALIST  IP CONSULT TO UROLOGY  IP CONSULT TO DIETITIAN    DISPO/placement plan: pending     Code Status: Full Code      MONALISA Maza - CRISTELA  05/13/22

## 2022-05-13 NOTE — DISCHARGE INSTR - COC
Continuity of Care Form    Patient Name: Magan Tierney   :  1965  MRN:  [id number]    Admit date:  2022  Discharge date:  ***    Code Status Order: Full Code   Advance Directives:      Admitting Physician:  Iman Jones MD  PCP: Glory Blanco MD    Discharging Nurse: [facility] Unit/Room#: T0Y-7305/3127-01  Discharging Unit Phone Number: ***    Emergency Contact:   Extended Emergency Contact Information  Primary Emergency Contact: JASWINDER Hartley [address] Phone: [phone]  Relation: Child  Secondary Emergency Contact: Caitlin George  Home Phone: [phone]  Relation: Brother/Sister    Past Surgical History:  Past Surgical History:   Procedure Laterality Date    BUNIONECTOMY       SECTION      CHOLECYSTECTOMY      COLECTOMY      CYSTOSCOPY Left 2022    CYSTOSCOPY URETERAL STENT INSERTION performed by Darrell Gonzalez MD at [facility], MULTIPLE    ERCP  2011     STENT REMOVAL, 5 FR 5 SM STENT PLACEMENT    ERCP      HYSTERECTOMY      JOINT REPLACEMENT      left knee    TUBAL LIGATION      UPPER GASTROINTESTINAL ENDOSCOPY  13    WITH BIOPSY AND BOTOX INJECTION    UPPER GASTROINTESTINAL ENDOSCOPY  3/25/14    with botox injection    UPPER GASTROINTESTINAL ENDOSCOPY  10-24-14    Esophagogastroduodenoscopy with Botulinum toxin injection of the pylorus    UPPER GASTROINTESTINAL ENDOSCOPY  3/24/15    WITH BOTOX INJECTION       Immunization History:   Immunization History   Administered Date(s) Administered    COVID-19, Gabriela Pedlar, Primary or Immunocompromised, PF, 100mcg/0.5mL 2021, 04/15/2021, 2021       Active Problems:  Patient Active Problem List   Diagnosis Code    Acute cystitis with hematuria N30.01    Flank pain R10.9       Isolation/Infection:   Isolation            No Isolation          Patient Infection Status       Infection Onset Added Last Indicated Last Indicated By Review Planned Expiration Resolved Resolved By    None active Resolved    COVID-19 (Rule Out) 22 COVID-19 (Ordered)   22 Rule-Out Test Resulted            Nurse Assessment:  Last Vital Signs: /65   Pulse 75   Temp 98.2 °F (36.8 °C) (Oral)   Resp 16   Ht 5' 3\" (1.6 m)   Wt 131 lb 2.8 oz (59.5 kg)   SpO2 96%   BMI 23.24 kg/m²     Last documented pain score (0-10 scale): Pain Level: 8  Last Weight:   Wt Readings from Last 1 Encounters:   22 131 lb 2.8 oz (59.5 kg)     Mental Status:  {IP PT MENTAL STATUS:}    IV Access:  { ADRIEN IV ACCESS:429659053}    Nursing Mobility/ADLs:  Walking   {CHP DME VWT}  Transfer  {CHP DME SCNF:488085299}  Bathing  {P DME NPKQ:251605164}  Dressing  {CHP DME MNMF:097130778}  Toileting  {CHP DME EMKV:039775178}  Feeding  {P DME BKKD:520257675}  Med Admin  {P DME SXYN:716148678}  Med Delivery   { ADRIEN MED Delivery:529094081}    Wound Care Documentation and Therapy:        Elimination:  Continence: Bowel: {YES / UY:54804}  Bladder: {YES / SX:48498}  Urinary Catheter: {Urinary Catheter:266113719}   Colostomy/Ileostomy/Ileal Conduit: {YES / ZC:23262}  Colostomy RLQ-Stomal Appliance: Clean, dry & intact  Colostomy RLQ-Peristomal Assessment: Clean, dry & intact  Colostomy RLQ-Stool Appearance: Loose  Colostomy RLQ-Stool Color: Brown  Colostomy RLQ-Stool Amount: Smear    Date of Last BM: ***    Intake/Output Summary (Last 24 hours) at 2022 1318  Last data filed at 2022 0919  Gross per 24 hour   Intake 360 ml   Output 500 ml   Net -140 ml     I/O last 3 completed shifts:   In: 120 [P.O.:120]  Out: -     Safety Concerns:     [address] [facility] Safety Concerns:133233877}    Impairments/Disabilities:      [address] JUR[address]O Impairments/Disabilities:725236663}    Nutrition Therapy:  Current Nutrition Therapy:   [address] JUR[address]O Diet List:179573308}    Routes of Feeding: {CHP DME Other Feedings:466403534}  Liquids: {Slp liquid thickness:38134}  Daily Fluid Restriction: {CHP DME Yes amt example:027955374}  Last Modified Barium Swallow with Video (Video Swallowing Test): {Done Not Done [facility]:185602137}    Treatments at the Time of Hospital Discharge:   Respiratory Treatments: ***  Oxygen Therapy:  {Therapy; copd oxygen:22115}  Ventilator:    { CC Vent MPZM:740023912}    Rehab Therapies: {THERAPEUTIC INTERVENTION:2381450118}  Weight Bearing Status/Restrictions: { CC Weight Bearin}  Other Medical Equipment (for information only, NOT a DME order):  {EQUIPMENT:075966512}  Other Treatments: ***    Patient's personal belongings (please select all that are sent with patient):  {[facility] DME Belongings:617911875}    RN SIGNATURE:  {Esignature:718622810}    CASE MANAGEMENT/SOCIAL WORK SECTION    Inpatient Status Date: ***    Readmission Risk Assessment Score:  Readmission Risk              Risk of Unplanned Readmission:  0           Discharging to Facility/ Agency   Name:   Address:  Phone:  Fax:    Dialysis Facility (if applicable)   Name:  Address:  Dialysis Schedule:  Phone:  Fax:    / signature: {Esignature:476727548}    PHYSICIAN SECTION    Prognosis: {Prognosis:4173040778}    Condition at Discharge: [address] [place] [address] Patient Condition:650669545}    Rehab Potential (if transferring to Rehab): {Prognosis:5256177023}    Recommended Labs or Other Treatments After Discharge: ***    Physician Certification: I certify the above information and transfer of Balta Redmond  is necessary for the continuing treatment of the diagnosis listed and that she requires {Admit to Appropriate Level of Care:03016} for {GREATER/LESS:363975244} 30 days.      Update Admission H&P: {CHP DME Changes in FIIKV:138662334}    PHYSICIAN SIGNATURE:  {Esignature:654629681}

## 2022-05-13 NOTE — PLAN OF CARE
Problem: Discharge Planning  Goal: Discharge to home or other facility with appropriate resources  5/13/2022 0736 by Gonsalo Riley RN  Outcome: Progressing  Flowsheets (Taken 5/13/2022 0439 by Oh Cox, RN)  Discharge to home or other facility with appropriate resources: Identify barriers to discharge with patient and caregiver     Problem: Pain  Goal: Verbalizes/displays adequate comfort level or baseline comfort level  5/13/2022 0736 by Gonsalo Riley RN  Outcome: Progressing  Flowsheets (Taken 5/13/2022 0529 by Oh Cox RN)  Verbalizes/displays adequate comfort level or baseline comfort level:   Encourage patient to monitor pain and request assistance   Assess pain using appropriate pain scale   Administer analgesics based on type and severity of pain and evaluate response   Implement non-pharmacological measures as appropriate and evaluate response   Consider cultural and social influences on pain and pain management   Notify Licensed Independent Practitioner if interventions unsuccessful or patient reports new pain     Problem: Safety - Adult  Goal: Free from fall injury  5/13/2022 0736 by Gonsalo Riley RN  Outcome: Progressing  Flowsheets (Taken 5/13/2022 0529 by Oh Cox RN)  Free From Fall Injury: Instruct family/caregiver on patient safety     Problem: ABCDS Injury Assessment  Goal: Absence of physical injury  5/13/2022 0736 by Gonsalo Riley RN  Outcome: Progressing  Flowsheets (Taken 5/13/2022 0529 by Oh Cox RN)  Absence of Physical Injury: Implement safety measures based on patient assessment     Problem: Skin/Tissue Integrity  Goal: Absence of new skin breakdown  Description: 1. Monitor for areas of redness and/or skin breakdown  2. Assess vascular access sites hourly  3. Every 4-6 hours minimum:  Change oxygen saturation probe site  4.   Every 4-6 hours:  If on nasal continuous positive airway pressure, respiratory therapy assess nares and determine need for appliance change or resting period.   5/13/2022 0736 by Anthony Pickett, RN  Outcome: Progressing  Note: Pt absent from new skin breakdown on this shift

## 2022-05-13 NOTE — PROGRESS NOTES
Physical Therapy  Facility/Department: [address] ORTHOPEDICS  Physical Therapy Initial Assessment  This note serves as patient discharge summary if pt discharges prior to next PT visit      Name: Mara Blanton  : 1965  MRN: [id number]  Date of Service: 2022    Discharge Recommendations:  Home with assist KAYLEE   Lucinda Marie scored a 23/24 on the AM-PAC short mobility form. PT Equipment Recommendations  Equipment Needed: No      Patient Diagnosis(es): The primary encounter diagnosis was Intractable pain. A diagnosis of Left flank pain was also pertinent to this visit. Past Medical History:  has a past medical history of Arthritis, Frequency of urination, Gastroparesis, GERD (gastroesophageal reflux disease), Hypertension, Juvenile Sjogren's syndrome due to another autoimmune disease ([facility], [place] [address] Migraines, neuralgic, and Pancreatitis. Past Surgical History:  has a past surgical history that includes ERCP; Hysterectomy;  section; Tubal ligation; Bunionectomy; colectomy; ERCP (2011); ERCP; Cholecystectomy; Upper gastrointestinal endoscopy (13); Upper gastrointestinal endoscopy (3/25/14); joint replacement; Upper gastrointestinal endoscopy (10-24-14); Upper gastrointestinal endoscopy (3/24/15); and Cystoscopy (Left, 2022). Assessment   Body Structures, Functions, Activity Limitations Requiring Skilled Therapeutic Intervention: Decreased sensation; Increased pain  Assessment: Per H and P:  \"65 y.o. yo female who  has a past medical history of Arthritis, Frequency of urination, GERD (gastroesophageal reflux disease), Hypertension, Migraines, neuralgic, and Pancreatitis. presents to [facility] emergency department complaining of left-sided flank pain that began 7 days ago. At that time it was located in her left lower back and flank associated with dysuria radiating around to her left groin and pelvis.   She was seen in the emergency department was found to have a 3 mm left ureterolithiasis, she underwent stenting with urology and was treated with ceftriaxone and discharged on cefuroxime. She was discharged with tramadol but she is back tonight because the tramadol is not helping. \"  CT scan negative for acute issue, Urinalysis negative. Urology consulted. Prior status: lives with \"caregiver\" and caregiver's family. Patient reports caregiver is needed primarily due to patient having \"a little dementia. \" Reports independence in ADLs and gait wihout device. Status 5-13-22: Bed mobility Mod Independent. Transfers Mod Indep. Gait no device 200' with SBA-Supervision. Quality and tolerance Fair, and seems impaired primarily by pain, and appearing fatigued. Anticiapte patient will be appropriate for returning to home as prior when medically cleared. Will follow and see as needed. Therapy Prognosis: Good  Decision Making: Low Complexity  History: as noted  Clinical Presentation: Stable  Requires PT Follow-Up: Yes (as needed)  Activity Tolerance  Activity Tolerance: Patient limited by pain; Patient limited by fatigue     Plan   Plan  Plan: 2-3 times per week (as needed)  Safety Devices  Type of Devices: Call light within reach,Left in bed,Nurse notified     Subjective   General  Chart Reviewed: Yes  Additional Pertinent Hx: Per H and P:  \"65 y.o. yo female who  has a past medical history of Arthritis, Frequency of urination, GERD (gastroesophageal reflux disease), Hypertension, Migraines, neuralgic, and Pancreatitis. presents to [facility] emergency department complaining of left-sided flank pain that began 7 days ago. At that time it was located in her left lower back and flank associated with dysuria radiating around to her left groin and pelvis. She was seen in the emergency department was found to have a 3 mm left ureterolithiasis, she underwent stenting with urology and was treated with ceftriaxone and discharged on cefuroxime.   She was discharged with tramadol but she is back tonight because the tramadol is not helping. \"  CT scan negative for acute issue, Urinalysis negative. Urology consulted. Response To Previous Treatment: Not applicable  Family / Caregiver Present: No  Referring Practitioner: Eleonora Puentes MD  Referral Date : 05/13/22  Subjective  Subjective: Patient in bed. Awake. Agreeable to therapy. Reports L flank pain 8-10/10. Also reports some numbness L LE, not new. Social/Functional History  Social/Functional History  Lives With: Other (comment) (With caregiver and caregiver's family.)  Type of Home: House  Home Layout: Two level,Bed/Bath upstairs  Bathroom Toilet: Standard  ADL Assistance: Independent  Homemaking Responsibilities: No  Ambulation Assistance: Independent  Transfer Assistance: Independent  Active : Yes (\"some\")  Additional Comments: Patient states her caregiver is needed primarily due to patient bouts of forgetfulness. Sleeps in regular bed. Does not use DME; unsure patient has any DME. Clarify     Vision/Hearing  Hearing: Within functional limits      Cognition   Orientation  Overall Orientation Status: Within Normal Limits  Cognition  Overall Cognitive Status: WFL     Objective   AROM RLE (degrees)  RLE AROM: WFL  AROM LLE (degrees)  LLE AROM : WFL  Strength RLE  Strength RLE: WFL  Comment: grossly symmetrical; uncertain if full effort given with MMT  Strength LLE  Strength LLE: WFL  Comment: grossly symmetrical; uncertain if full effort given with MMT  Bed mobility  Supine to Sit: Modified independent  Sit to Supine: Modified independent  Transfers  Sit to Stand: Modified independent  Stand to sit: Modified independent  Comment: trace extra time  Ambulation  Surface: level tile  Device: No Device  Assistance: Stand by assistance;Supervision  Quality of Gait: Trace flexed trunk. Step through pattern. Min deviated gait path but no loss of balance. Slow. Distance: 10',  200'.   Patient uses commode independently, including josé miguel care and washing hands at sink. Patient requests return to bed after ambulation. More Ambulation?: No  Stairs/Curb  Stairs? :  (patient declines, citing pain.)  Balance  Posture: Good  Sitting - Static: Good  Sitting - Dynamic: Good  Standing - Static: Good  Standing - Dynamic: Good;-  Comments: appears generally fatigued. AM-PAC Score  AM-PAC Inpatient Mobility Raw Score : 23 (05/13/22 1112)  AM-PAC Inpatient T-Scale Score : 56.93 (05/13/22 1112)  Mobility Inpatient CMS 0-100% Score: 11.2 (05/13/22 1112)  Mobility Inpatient CMS G-Code Modifier : CI (05/13/22 1112)     Goals  Short Term Goals  Time Frame for Short term goals: By acute DC  Short term goal 1: Bed mobility independent  Short term goal 2: Transfers Indep  Short term goal 3: Gait without device 200' Supervision  Short term goal 4: Stairs as needed for home with Supervision. Patient Goals   Patient goals :  \"Less pain.:       Education  Patient Education  Education Provided: Role of Therapy;Plan of Care      Therapy Time   Individual Concurrent Group Co-treatment   Time In 0930         Time Out 1000         Minutes 30            EV 15; TG 35    [id number] D Rashawn Queen PT  Electronically signed by Shantel Martin, [address] (#[phone])  on 5/13/2022 at 11:16 AM

## 2022-05-13 NOTE — ED NOTES
Pt c/o itching s/p pain medication administration despite Benadryl given. NP notified, new orders received as noted. Call light within reach. Family at bedside.       Nunu Kc RN  05/13/22 8848

## 2022-05-13 NOTE — PLAN OF CARE
Problem: Discharge Planning  Goal: Discharge to home or other facility with appropriate resources  Outcome: Progressing  Flowsheets (Taken 5/13/2022 0439)  Discharge to home or other facility with appropriate resources: Identify barriers to discharge with patient and caregiver     Problem: Pain  Goal: Verbalizes/displays adequate comfort level or baseline comfort level  Outcome: Progressing  Flowsheets (Taken 5/13/2022 0529)  Verbalizes/displays adequate comfort level or baseline comfort level:   Encourage patient to monitor pain and request assistance   Assess pain using appropriate pain scale   Administer analgesics based on type and severity of pain and evaluate response   Implement non-pharmacological measures as appropriate and evaluate response   Consider cultural and social influences on pain and pain management   Notify Licensed Independent Practitioner if interventions unsuccessful or patient reports new pain     Problem: Safety - Adult  Goal: Free from fall injury  Outcome: Progressing  Flowsheets (Taken 5/13/2022 0529)  Free From Fall Injury: Instruct family/caregiver on patient safety     Problem: ABCDS Injury Assessment  Goal: Absence of physical injury  Outcome: Progressing  Flowsheets (Taken 5/13/2022 0529)  Absence of Physical Injury: Implement safety measures based on patient assessment     Problem: Skin/Tissue Integrity  Goal: Absence of new skin breakdown  Description: 1.   Monitor for areas of redness and/or skin breakdown  Outcome: Progressing

## 2022-05-14 NOTE — DISCHARGE SUMMARY
Hospital Medicine Discharge Summary    Patient ID: Pau Spicer      Patient's PCP: Raj Rivera MD    Admit Date: 5/12/2022     Discharge Date: 5/13/2022      Admitting Physician: Claudette Amaral MD     Discharge Physician: Winifred Fraser, APRN - CNP       Discharge Diagnoses: Active Hospital Problems    Diagnosis Date Noted    Flank pain [R10.9] 05/13/2022     Priority: Medium       The patient was seen and examined on day of discharge and this discharge summary is in conjunction with any daily progress note from day of discharge.     Disposition:  [x] Home  [] Home with home health [] Rehab [] Psych [] SNF  [] LTAC  [] Long term nursing home or group home [] Transfer to ICU  [] Transfer to PCU [] Other:    Hospital Course:   65 y.o. yo female who  has a past medical history of Arthritis, Frequency of urination, GERD (gastroesophageal reflux disease), Hypertension, Migraines, neuralgic, and Pancreatitis. presents to [facility] emergency department complaining of left-sided flank pain that began 7 days ago. Tobin Bolds that time it was located in her left lower back and flank associated with dysuria radiating around to her left groin and pelvis.  She was seen in the emergency department was found to have a 3 mm left ureterolithiasis, she underwent stenting with urology and was treated with ceftriaxone and discharged on cefuroxime.  She was discharged with tramadol but she is back tonight because the tramadol is not helping.  She is experiencing severe stabbing left flank pain associated with nausea, nonradiating, no other inciting events, exacerbating or relieving factors, her symptoms are constant.  CT in the emergency department shows no additional ureterolithiasis.  She has a stent in the left ureter that is in good position and several nonobstructing nephrolithiases in bilateral kidneys.  Despite fentanyl, hydromorphone and antiemetics she is not feeling any better so ER is requesting admission for pain control.  In the ED her urinalysis did not appear to be infected.  We will admit her to observation. Urology was consulted- No acute  intervention, flank pain can be expected with stent in place. Flomax, pyridum and pain medications as needed  - She is scheduled for 5/23/22 for ureteroscopy, will try to move this up to Monday outpatient with Dr. Mike Pérez. She can otherwise be discharged home from  [facility]. Our surgery schedulers will reach out to her.         I ran an OARRS report on the patient and found  Prescriptions for tramadol filled 5/11 #30 x5 days. Xray done per her request for swelling and pain due to IV she had at prior admission. x-ray  Neg. Apply ice and elevate.          #Flank pain  #S/P ureteral stent placement  #Nephrolithiasis  #HTN     -Flank pain, refractory to pain meds  -CT scan repeated in the ER, no residual ureterolithiasis, no obstruction, no hydronephrosis, stent in good position  -As needed analgesics, synthetic opioids for severe pain  -Urology consulted for any additional recommendations  -UA not concerning for UTI.  Continue ABX as prescribed to finish course  -As needed antihypertensives  - Continue to monitor electrolytes and replete as appropriate  - Pt high risk for vte, lmwh for vte ppx.  - Continue medications for chronic problems  - added pyridium for burning with urination as well as norco for better pain control. Do not take ultram with the norco.   - f/u urology as advised.      Exam:     /71   Pulse 82   Temp 98.1 °F (36.7 °C) (Oral)   Resp 16   Ht 5' 3\" (1.6 m)   Wt 131 lb 2.8 oz (59.5 kg)   SpO2 97%   BMI 23.24 kg/m²   General appearance: No apparent distress, appears stated age and cooperative. HEENT: Pupils equal, round, and reactive to light. Conjunctivae/corneas clear. Neck: Supple, with full range of motion. No jugular venous distention. Trachea midline. Respiratory:  Normal respiratory effort.  Clear to auscultation, bilaterally without Rales/Wheezes/Rhonchi. Cardiovascular: Regular rate and rhythm with normal S1/S2 without murmurs, rubs or gallops. Abdomen: Soft, non-tender, non-distended with normal bowel sounds. Musculoskelatal: No clubbing, cyanosis or edema bilaterally. Full range of motion without deformity. Skin: Skin color, texture, turgor normal.  No rashes or lesions. Neurologic:  Neurovascularly intact without any focal sensory/motor deficits. Cranial nerves: II-XII intact, grossly non-focal.  Psychiatric: Alert and oriented, thought content appropriate, normal insight      Consults:     IP CONSULT TO HOSPITALIST  IP CONSULT TO UROLOGY  IP CONSULT TO DIETITIAN    Diagnostic tests:    Imaging:  CT ABDOMEN PELVIS WO CONTRAST Additional Contrast? None   Final Result   Punctate nonobstructing renal stones bilaterally.       Interval placement of a left double-J ureteral stent.       No ureteral stone on either side.       Multiple fluid-filled small bowel loops, nonspecific finding which can be   seen with acute enteritis.                 Labs: For convenience and continuity at follow-up the following most recent labs are provided:      CBC:    Lab Results   Component Value Date    WBC 3.5 05/12/2022    HGB 11.8 05/12/2022    HCT 35.5 05/12/2022     05/12/2022       Renal:    Lab Results   Component Value Date     05/12/2022    K 4.2 05/12/2022     05/12/2022    CO2 24 05/12/2022    BUN 16 05/12/2022    CREATININE 1.0 05/12/2022    CALCIUM 9.7 05/12/2022    PHOS 3.5 05/09/2022           Discharge Instructions/Follow-up:  Urology f/u as indicated :   - She is scheduled for 5/23/22 for ureteroscopy, will try to move this up to Monday outpatient with Dr. Isela Dodd.      PCP/SNF to follow up: pcp 1 wk     D/C condition: good    Code status: full    Activity: ad miguel    Diet: general      Discharge Medications:     Discharge Medication List as of 5/13/2022  3:16 PM           Details HYDROcodone-acetaminophen (NORCO) 7.5-325 MG per tablet Take 1 tablet by mouth every 6 hours as needed for Pain for up to 3 days. Intended supply: 3 days. Take lowest dose possible to manage pain, Disp-15 tablet, R-0Print              Details   phenazopyridine (PYRIDIUM) 100 MG tablet Take 1 tablet by mouth 3 times daily as needed for Pain, Disp-6 tablet, R-0Normal              Details   tamsulosin (FLOMAX) 0.4 MG capsule Take 1 capsule by mouth daily, Disp-30 capsule, R-3Normal      traMADol (ULTRAM) 50 MG tablet Take 2 tablets by mouth every 8 hours as needed for Pain for up to 5 days. , Disp-30 tablet, R-0Normal      acetaminophen (TYLENOL) 500 MG tablet Take 1,000 mg by mouth in the morning and at bedtimeHistorical Med      lipase-protease-amylase (CREON) 16056-160287 units CPEP delayed release capsule Take 2 capsules by mouth 4 times daily Historical Med      estradiol (VIVELLE) 0.0375 MG/24HR Place 1 patch onto the skin Twice a Week Wednesdays and SundaysHistorical Med      hydroxychloroquine (PLAQUENIL) 200 MG tablet Take 300 mg by mouth dailyHistorical Med      Lifitegrast (XIIDRA) 5 % SOLN Place 1 drop into both eyes dailyHistorical Med      vitamin D (CHOLECALCIFEROL) 25 MCG (1000 UT) TABS tablet Take 1,000 Units by mouth dailyHistorical Med      cevimeline (EVOXAC) 30 MG capsule Take 30 mg by mouth 3 times dailyHistorical Med      DULoxetine (CYMBALTA) 60 MG extended release capsule Take 120 mg by mouth daily Historical Med      pantoprazole (PROTONIX) 40 MG tablet Take 1 tablet by mouth dailyHistorical Med      zolpidem (AMBIEN) 10 MG tablet Take 10 mg by mouth nightly. Historical Med      traZODone (DESYREL) 100 MG tablet Take 200 mg by mouth nightly Historical Med      Melatonin 10 MG TABS Take 10 mg by mouth nightly Historical Med      acyclovir (ZOVIRAX) 400 MG tablet Take 400 mg by mouth daily Historical Med             Time Spent on discharge is more than 45 mins  in the examination, evaluation, counseling and review of medications and discharge plan. Signed:    MONALSIA Vang - CNP   5/14/2022      Thank you Jay Brunson MD for the opportunity to be involved in this patient's care. If you have any questions or concerns please feel free to contact me at [phone].

## 2022-06-16 NOTE — DISCHARGE SUMMARY
Hospital Medicine Discharge Summary    Patient ID: Beryl Mejía      Patient's PCP: Rosa Maria Conroy MD    Admit Date: 5/7/2022     Discharge Date: 5/9/2022      Admitting Provider: Amrita Fabian MD     Discharge Provider: Yon Garrison MD     Discharge Diagnoses: Active Hospital Problems    Diagnosis     Acute cystitis with hematuria [N30.01]      Priority: Medium       The patient was seen and examined on day of discharge and this discharge summary is in conjunction with any daily progress note from day of discharge. Hospital Course: This is a pleasant 65 y.o. female with history of osteoarthritis, essential hypertension, CKD stage III, migraine headaches, history of kidney stones, who presents to the emergency room with complaints of lower/suprapubic abdominal pain, left lower back pain, dysuria, dark-colored urine, ongoing for the past 4 days. There is no fever or chills. In the emergency room, urinalysis is concerning for possible infection. CT-abdomen/pelvis with indeterminate 3 mm calcification in the left upper pelvis possibly a distal ureteral calculus but no significant hydronephrosis. Urologist was consulted from the emergency room, recommends admission for further evaluation. Patient was given a dose of Rocephin for possible infected kidney stone.         1. Acute cystitis - started on Rocephin.  Continue Rocephin-->PO abx on dc     2. Nephrolithiasis, possible ureterolithiasis.  Started Flomax.  urology consulted - s/p OR for cystoscopy and stent placement     3. Patient does not have acute kidney injury.  Baseline creatinine is about 1.1 (see Care Everywhere).  Current creatinine of 1.2      4. Other comorbidities: history of osteoarthritis, CKD stage III, essential hypertension, migraine headaches, history of kidney stones.       Physical Exam Performed:     /78   Pulse 92   Temp 98.3 °F (36.8 °C) (Oral)   Resp 16   Ht 5' 3\" (1.6 m)   Wt 135 lb 3.2 oz Last seen  5/14/18  Last filled    1/3/18  Metoprolol                      5/17/18  Atorastatin                       5/21/18  Benazepril   Appt    8/15/18   Requested Prescriptions     Pending Prescriptions Disp Refills    metoprolol succinate (TOPROL XL) 200 MG extended release tablet [Pharmacy Med Name: METOPROLOL SUCCINATE  MG Tablet Extended Release 24 Hour] 90 tablet 1     Sig: TAKE 1 TABLET EVERY DAY    atorvastatin (LIPITOR) 20 MG tablet [Pharmacy Med Name: ATORVASTATIN CALCIUM 20 MG Tablet] 90 tablet 0     Sig: TAKE 1 TABLET EVERY DAY    benazepril (LOTENSIN) 40 MG tablet [Pharmacy Med Name: BENAZEPRIL HCL 40 MG Tablet] 90 tablet 0     Sig: TAKE 1 TABLET EVERY DAY (61.3 kg)   SpO2 98%   BMI 23.95 kg/m²       Gen/overall appearance: Not in acute distress. Alert. Oriented X3  Head: Normocephalic, atraumatic  Eyes: EOMI, good acuity  ENT: Oral mucosa moist  Neck: No JVD, thyromegaly  CVS: Nml S1S2, no MRG, RRR  Pulm: Clear bilaterally. No crackles/wheezes  Gastrointestinal: Soft, NT/ND, +BS  Musculoskeletal: Left CVA tenderness on exam. No edema. Warm  Neuro: No focal deficit. Moves extremity spontaneously. Psychiatry: Appropriate affect. Not agitated. Skin: Warm, dry with normal turgor. No rash  Capillary refill: Brisk,< 3 seconds   Peripheral Pulses: +2 palpable, equal bilaterally     Labs: For convenience and continuity at follow-up the following most recent labs are provided:      CBC:    Lab Results   Component Value Date    WBC 3.5 05/12/2022    HGB 11.8 05/12/2022    HCT 35.5 05/12/2022     05/12/2022       Renal:    Lab Results   Component Value Date     05/12/2022    K 4.2 05/12/2022     05/12/2022    CO2 24 05/12/2022    BUN 16 05/12/2022    CREATININE 1.0 05/12/2022    CALCIUM 9.7 05/12/2022    PHOS 3.5 05/09/2022         Significant Diagnostic Studies    Radiology:   CT ABDOMEN PELVIS WO CONTRAST Additional Contrast? None   Final Result   1. Indeterminate 3 mm calcification in the left upper pelvis, possibly a   distal ureteral calculus. No significant hydronephrosis. Additional   nonobstructive nephrolithiasis. 2. Status post cholecystectomy. Pneumobilia presumably related to previous   sphincterotomy. 3. Status post colectomy with right lower quadrant ileostomy. No acute bowel   pathology. 4. Two cystic lesions in the right adnexa with no imaging follow-up   indicated. Previous hysterectomy.                 Consults:     IP CONSULT TO UROLOGY  IP CONSULT TO HOSPITALIST  IP CONSULT TO SPIRITUAL SERVICES    Disposition:  home     Condition at Discharge: Stable    Discharge Instructions/Follow-up:  PCPin 1 week    Code Status:  Prior Activity: activity as tolerated    Diet: regular diet      Discharge Medications:     Discharge Medication List as of 5/9/2022  5:08 PM           Details   tamsulosin (FLOMAX) 0.4 MG capsule Take 1 capsule by mouth daily, Disp-30 capsule, R-3Normal      cefUROXime (CEFTIN) 250 MG tablet Take 1 tablet by mouth 2 times daily for 7 days, Disp-14 tablet, R-0Normal      phenazopyridine (PYRIDIUM) 100 MG tablet Take 1 tablet by mouth 3 times daily as needed for Pain, Disp-15 tablet, R-0Normal              Details   traMADol (ULTRAM) 50 MG tablet Take 2 tablets by mouth every 8 hours as needed for Pain for up to 5 days. , Disp-30 tablet, R-0Normal              Details   acetaminophen (TYLENOL) 500 MG tablet Take 1,000 mg by mouth in the morning and at bedtimeHistorical Med      lipase-protease-amylase (CREON) 46074-186906 units CPEP delayed release capsule Take 2 capsules by mouth 4 times daily Historical Med      estradiol (VIVELLE) 0.0375 MG/24HR Place 1 patch onto the skin Twice a Week Wednesdays and SundaysHistorical Med      hydroxychloroquine (PLAQUENIL) 200 MG tablet Take 300 mg by mouth dailyHistorical Med      Lifitegrast (XIIDRA) 5 % SOLN Place 1 drop into both eyes dailyHistorical Med      vitamin D (CHOLECALCIFEROL) 25 MCG (1000 UT) TABS tablet Take 1,000 Units by mouth dailyHistorical Med      ondansetron (ZOFRAN-ODT) 4 MG disintegrating tablet Take 4 mg by mouth as needed for Nausea or VomitingHistorical Med      cevimeline (EVOXAC) 30 MG capsule Take 30 mg by mouth 3 times dailyHistorical Med      DULoxetine (CYMBALTA) 60 MG extended release capsule Take 120 mg by mouth daily Historical Med      pantoprazole (PROTONIX) 40 MG tablet Take 1 tablet by mouth dailyHistorical Med      zolpidem (AMBIEN) 10 MG tablet Take 10 mg by mouth nightly. Historical Med      traZODone (DESYREL) 100 MG tablet Take 200 mg by mouth nightly Historical Med      Melatonin 10 MG TABS Take 10 mg by mouth nightly Historical Med acyclovir (ZOVIRAX) 400 MG tablet Take 400 mg by mouth daily Historical Med             Time Spent on discharge is more than 30 minutes in the examination, evaluation, counseling and review of medications and discharge plan. Signed:    Karishma Sandoval MD   6/16/2022      Thank you Shlomo Phillips MD for the opportunity to be involved in this patient's care. If you have any questions or concerns, please feel free to contact me at [phone].

## 2024-01-19 ENCOUNTER — HOSPITAL ENCOUNTER (EMERGENCY)
Age: 59
Discharge: HOME | End: 2024-01-19
Payer: MEDICARE

## 2024-01-19 VITALS
HEART RATE: 77 BPM | WEIGHT: 151.68 LBS | TEMPERATURE: 97.8 F | BODY MASS INDEX: 26.87 KG/M2 | DIASTOLIC BLOOD PRESSURE: 76 MMHG | OXYGEN SATURATION: 100 % | RESPIRATION RATE: 15 BRPM | SYSTOLIC BLOOD PRESSURE: 114 MMHG

## 2024-01-19 DIAGNOSIS — B34.9 VIRAL SYNDROME: ICD-10-CM

## 2024-01-19 DIAGNOSIS — M54.50 LOW BACK PAIN, UNSPECIFIED BACK PAIN LATERALITY, UNSPECIFIED CHRONICITY, UNSPECIFIED WHETHER SCIATICA PRESENT: Primary | ICD-10-CM

## 2024-01-19 LAB
ALBUMIN SERPL-MCNC: 3.9 G/DL (ref 3.4–5)
ALBUMIN/GLOB SERPL: 1.3 {RATIO} (ref 1.1–2.2)
ALP SERPL-CCNC: 44 U/L (ref 40–129)
ALT SERPL-CCNC: 14 U/L (ref 10–40)
ANION GAP SERPL CALCULATED.3IONS-SCNC: 6 MMOL/L (ref 3–16)
AST SERPL-CCNC: 27 U/L (ref 15–37)
BACTERIA URNS QL MICRO: ABNORMAL /HPF
BASOPHILS # BLD: 0 K/UL (ref 0–0.2)
BASOPHILS NFR BLD: 0.7 %
BILIRUB SERPL-MCNC: 0.3 MG/DL (ref 0–1)
BILIRUB UR QL STRIP.AUTO: NEGATIVE
BUN SERPL-MCNC: 16 MG/DL (ref 7–20)
CALCIUM SERPL-MCNC: 8.9 MG/DL (ref 8.3–10.6)
CHLORIDE SERPL-SCNC: 104 MMOL/L (ref 99–110)
CLARITY UR: CLEAR
CO2 SERPL-SCNC: 26 MMOL/L (ref 21–32)
COLOR UR: ABNORMAL
CREAT SERPL-MCNC: 1.1 MG/DL (ref 0.6–1.1)
DEPRECATED RDW RBC AUTO: 15.1 % (ref 12.4–15.4)
EOSINOPHIL # BLD: 0 K/UL (ref 0–0.6)
EOSINOPHIL NFR BLD: 0.4 %
EPI CELLS #/AREA URNS AUTO: 4 /HPF (ref 0–5)
FLUAV RNA UPPER RESP QL NAA+PROBE: NEGATIVE
FLUBV AG NPH QL: NEGATIVE
GFR SERPLBLD CREATININE-BSD FMLA CKD-EPI: 58 ML/MIN/{1.73_M2}
GLUCOSE SERPL-MCNC: 72 MG/DL (ref 70–99)
GLUCOSE UR STRIP.AUTO-MCNC: NEGATIVE MG/DL
HCT VFR BLD AUTO: 35.3 % (ref 36–48)
HGB BLD-MCNC: 11.6 G/DL (ref 12–16)
HGB UR QL STRIP.AUTO: NEGATIVE
HYALINE CASTS #/AREA URNS AUTO: 0 /LPF (ref 0–8)
KETONES UR STRIP.AUTO-MCNC: NEGATIVE MG/DL
LEUKOCYTE ESTERASE UR QL STRIP.AUTO: NEGATIVE
LYMPHOCYTES # BLD: 1.1 K/UL (ref 1–5.1)
LYMPHOCYTES NFR BLD: 35.3 %
MCH RBC QN AUTO: 28.9 PG (ref 26–34)
MCHC RBC AUTO-ENTMCNC: 32.8 G/DL (ref 31–36)
MCV RBC AUTO: 88.1 FL (ref 80–100)
MONOCYTES # BLD: 0.3 K/UL (ref 0–1.3)
MONOCYTES NFR BLD: 10.7 %
NEUTROPHILS # BLD: 1.6 K/UL (ref 1.7–7.7)
NEUTROPHILS NFR BLD: 52.9 %
NITRITE UR QL STRIP.AUTO: NEGATIVE
PH UR STRIP.AUTO: 5.5 [PH] (ref 5–8)
PLATELET # BLD AUTO: 144 K/UL (ref 135–450)
PMV BLD AUTO: 9.2 FL (ref 5–10.5)
POTASSIUM SERPL-SCNC: 4.3 MMOL/L (ref 3.5–5.1)
PROT SERPL-MCNC: 6.8 G/DL (ref 6.4–8.2)
PROT UR STRIP.AUTO-MCNC: ABNORMAL MG/DL
RBC # BLD AUTO: 4 M/UL (ref 4–5.2)
RBC CLUMPS #/AREA URNS AUTO: 2 /HPF (ref 0–4)
SARS-COV-2 RDRP RESP QL NAA+PROBE: NOT DETECTED
SODIUM SERPL-SCNC: 136 MMOL/L (ref 136–145)
SP GR UR STRIP.AUTO: 1.03 (ref 1–1.03)
UA COMPLETE W REFLEX CULTURE PNL UR: ABNORMAL
UA DIPSTICK W REFLEX MICRO PNL UR: YES
URN SPEC COLLECT METH UR: ABNORMAL
UROBILINOGEN UR STRIP-ACNC: 0.2 E.U./DL
WBC # BLD AUTO: 3.1 K/UL (ref 4–11)
WBC #/AREA URNS AUTO: 2 /HPF (ref 0–5)

## 2024-01-19 PROCEDURE — 81001 URINALYSIS AUTO W/SCOPE: CPT

## 2024-01-19 PROCEDURE — 85025 COMPLETE CBC W/AUTO DIFF WBC: CPT

## 2024-01-19 PROCEDURE — 87804 INFLUENZA ASSAY W/OPTIC: CPT

## 2024-01-19 PROCEDURE — 6370000000 HC RX 637 (ALT 250 FOR IP): Performed by: GENERAL ACUTE CARE HOSPITAL

## 2024-01-19 PROCEDURE — 99283 EMERGENCY DEPT VISIT LOW MDM: CPT

## 2024-01-19 PROCEDURE — 87635 SARS-COV-2 COVID-19 AMP PRB: CPT

## 2024-01-19 PROCEDURE — 80053 COMPREHEN METABOLIC PANEL: CPT

## 2024-01-19 RX ORDER — CYCLOBENZAPRINE HCL 10 MG
10 TABLET ORAL 3 TIMES DAILY PRN
Qty: 20 TABLET | Refills: 0 | Status: SHIPPED | OUTPATIENT
Start: 2024-01-19 | End: 2024-01-26

## 2024-01-19 RX ORDER — ACETAMINOPHEN 500 MG
1000 TABLET ORAL ONCE
Status: COMPLETED | OUTPATIENT
Start: 2024-01-19 | End: 2024-01-19

## 2024-01-19 RX ORDER — LIDOCAINE 4 G/G
1 PATCH TOPICAL DAILY
Qty: 30 PATCH | Refills: 0 | Status: SHIPPED | OUTPATIENT
Start: 2024-01-19 | End: 2024-02-18

## 2024-01-19 RX ORDER — TRAMADOL HYDROCHLORIDE 50 MG/1
50 TABLET ORAL ONCE
Status: COMPLETED | OUTPATIENT
Start: 2024-01-19 | End: 2024-01-19

## 2024-01-19 RX ADMIN — TRAMADOL HYDROCHLORIDE 50 MG: 50 TABLET, FILM COATED ORAL at 14:12

## 2024-01-19 RX ADMIN — ACETAMINOPHEN 1000 MG: 500 TABLET ORAL at 14:13

## 2024-01-19 ASSESSMENT — PAIN SCALES - GENERAL
PAINLEVEL_OUTOF10: 10
PAINLEVEL_OUTOF10: 10
PAINLEVEL_OUTOF10: 9

## 2024-01-19 ASSESSMENT — PAIN - FUNCTIONAL ASSESSMENT: PAIN_FUNCTIONAL_ASSESSMENT: NONE - DENIES PAIN

## 2024-01-19 NOTE — ED PROVIDER NOTES
MICROSCOPIC URINALYSIS - Abnormal; Notable for the following components:    Bacteria, UA 1+ (*)     All other components within normal limits   COVID-19, RAPID   RAPID INFLUENZA A/B ANTIGENS       When ordered only abnormal lab results are displayed. All other labs were within normal range or not returned as of this dictation.    EKG: When ordered, EKG's are interpreted by the Emergency Department Physician in the absence of a cardiologist.  Please see their note for interpretation of EKG.    RADIOLOGY:   Non-plain film images such as CT, Ultrasound and MRI are read by the radiologist. Plain radiographic images are visualized and preliminarily interpreted by the ED Provider with the below findings:    ***    Interpretation per the Radiologist below, if available at the time of this note:    No orders to display     No results found.    No results found.    PROCEDURES   Unless otherwise noted below, none     Procedures    CRITICAL CARE TIME (.cctime)   ***    PAST MEDICAL HISTORY      has a past medical history of Arthritis, Frequency of urination, Gastroparesis, GERD (gastroesophageal reflux disease), Hypertension, Juvenile Sjogren's syndrome due to another autoimmune disease (HCC), Migraines, neuralgic, and Pancreatitis.     EMERGENCY DEPARTMENT COURSE and DIFFERENTIAL DIAGNOSIS/MDM:   Vitals:    Vitals:    01/19/24 1315 01/19/24 1412   BP: 114/76    Pulse: 77    Resp: 20 15   Temp: 97.8 °F (36.6 °C)    TempSrc: Oral    SpO2: 100%    Weight: 68.8 kg (151 lb 10.8 oz)        Patient was given the following medications:  Medications   acetaminophen (TYLENOL) tablet 1,000 mg (1,000 mg Oral Given 1/19/24 1413)   traMADol (ULTRAM) tablet 50 mg (50 mg Oral Given 1/19/24 1412)             Is this patient to be included in the SEP-1 Core Measure due to severe sepsis or septic shock?   {[id number]}    Chronic Conditions affecting care: ***   has a past medical history of Arthritis, Frequency of urination,  chronic back problems.  Patient states that she wants her \"kidneys checked out\".  Patient also reports some generalized bodyaches and mild nasal congestion.  She denies recent travel or known sick contacts.  Patient denies loss of bowel or bladder control, saddle anesthesia, or extremity numbness or tingling.  Patient denies history of IV drug abuse or any cancers.  Patient describes the pain as constant, dull, and aching.  There are no aggravating or alleviating factors.  Patient has not taken anything for symptoms.  She denies urinary frequency, dysuria, or hematuria.  She denies fever, chills, or other symptoms.  Physical exam complete.  Patient arrives nontoxic, afebrile, normotensive.  GCS is 15.  She is without any focal neurologic deficits.  Patient is able to ambulate unassisted.  Patient with mild bilateral lumbar paraspinal muscle tenderness noted.  No midline spinal tenderness.  There is no CVA tenderness.  Patient's abdomen is soft and nontender.    Differential diagnoses include but not limited to viral URI, COVID, influenza, UTI, pyelonephritis, sepsis, cauda equina, epidural abscess, epidural hematoma, myalgias, exacerbation of chronic pain    Patient medicated with Tylenol 1 g by mouth and tramadol 50 mg by mouth.  Patient negative for COVID and influenza.  Laboratory studies show a normal sodium of 136.  Potassium is normal at 4.3.  Chloride normal at 104.  Creatinine normal at 1.1.  There is no significant electrolyte derangement or ZAINAB.  LFTs are within normal limits.  White blood cell count decreased to 3.1.  H&H is stable at 11.6 and 35.3 respectively.  Urinalysis without evidence of infection.    Patient reassessed and updated on test results.  Patient has remained hemodynamically stable.  Patient does report some improvement of symptoms after intervention.  It is likely that symptoms related to a viral infection.  At this time there is no evidence of any life-threatening or emergent conditions

## 2024-01-19 NOTE — DISCHARGE INSTRUCTIONS
Apply ice to the affected area for 20 minutes every 3-4 hours.  Begin gentle stretching exercises tomorrow.  Take OTC Tylenol or ibuprofen as directed for discomfort.  You may take the prescribed Flexeril to help with your symptoms.  Follow-up with your primary care physician on Monday.

## 2024-01-22 ASSESSMENT — ENCOUNTER SYMPTOMS
ABDOMINAL PAIN: 1
BACK PAIN: 1
NAUSEA: 1
CONSTIPATION: 1

## (undated) DEVICE — GLOVE SURG SZ 7.5 L11.73IN FNGR THK9.8MIL STRW LTX POLYMER

## (undated) DEVICE — GOWN SIRUS NONREIN XL W/TWL: Brand: MEDLINE INDUSTRIES, INC.

## (undated) DEVICE — SOLUTION IV IRRIG WATER 1000ML POUR BRL 2F7114

## (undated) DEVICE — GUIDEWIRE URO L150CM DIA0.035IN PTFE NIT HYDRPHLC STR TIP

## (undated) DEVICE — SYRINGE MED 10ML LUERLOCK TIP W/O SFTY DISP

## (undated) DEVICE — SOLUTION IRRIG 3000ML STRL H2O USP UROMATIC PLAS CONT

## (undated) DEVICE — CYSTOSCOPY: Brand: MEDLINE INDUSTRIES, INC.